# Patient Record
Sex: MALE | Race: WHITE | NOT HISPANIC OR LATINO
[De-identification: names, ages, dates, MRNs, and addresses within clinical notes are randomized per-mention and may not be internally consistent; named-entity substitution may affect disease eponyms.]

---

## 2017-04-10 ENCOUNTER — APPOINTMENT (OUTPATIENT)
Dept: COLORECTAL SURGERY | Facility: CLINIC | Age: 57
End: 2017-04-10

## 2017-04-10 DIAGNOSIS — Z87.891 PERSONAL HISTORY OF NICOTINE DEPENDENCE: ICD-10-CM

## 2017-04-10 RX ORDER — MELOXICAM 15 MG/1
15 TABLET ORAL
Qty: 30 | Refills: 0 | Status: ACTIVE | COMMUNITY
Start: 2016-12-01

## 2017-04-10 RX ORDER — LOSARTAN POTASSIUM 50 MG/1
50 TABLET, FILM COATED ORAL
Qty: 90 | Refills: 0 | Status: ACTIVE | COMMUNITY
Start: 2017-01-30

## 2017-04-10 RX ORDER — FINASTERIDE 5 MG/1
5 TABLET, FILM COATED ORAL
Qty: 90 | Refills: 0 | Status: ACTIVE | COMMUNITY
Start: 2017-01-30

## 2017-04-10 RX ORDER — HALOBETASOL PROPIONATE 0.5 MG/G
0.05 OINTMENT TOPICAL
Qty: 50 | Refills: 0 | Status: ACTIVE | COMMUNITY
Start: 2017-02-02

## 2017-04-10 RX ORDER — ZOLPIDEM TARTRATE 10 MG/1
10 TABLET ORAL
Qty: 30 | Refills: 0 | Status: ACTIVE | COMMUNITY
Start: 2016-12-12

## 2017-04-10 RX ORDER — ECONAZOLE NITRATE 10 MG/G
1 CREAM TOPICAL
Qty: 15 | Refills: 0 | Status: ACTIVE | COMMUNITY
Start: 2017-01-17

## 2017-04-10 RX ORDER — KETOCONAZOLE 20 MG/G
2 CREAM TOPICAL
Qty: 30 | Refills: 0 | Status: ACTIVE | COMMUNITY
Start: 2017-02-06

## 2017-04-10 RX ORDER — HALOBETASOL PROPIONATE 0.5 MG/G
0.05 CREAM TOPICAL
Qty: 50 | Refills: 0 | Status: ACTIVE | COMMUNITY
Start: 2016-12-22

## 2017-04-19 ENCOUNTER — OUTPATIENT (OUTPATIENT)
Dept: OUTPATIENT SERVICES | Facility: HOSPITAL | Age: 57
LOS: 1 days | End: 2017-04-19
Payer: MEDICARE

## 2017-04-19 VITALS
SYSTOLIC BLOOD PRESSURE: 147 MMHG | RESPIRATION RATE: 14 BRPM | HEIGHT: 68 IN | TEMPERATURE: 98 F | HEART RATE: 79 BPM | DIASTOLIC BLOOD PRESSURE: 85 MMHG | WEIGHT: 315 LBS | OXYGEN SATURATION: 96 %

## 2017-04-19 DIAGNOSIS — G47.33 OBSTRUCTIVE SLEEP APNEA (ADULT) (PEDIATRIC): ICD-10-CM

## 2017-04-19 DIAGNOSIS — K60.2 ANAL FISSURE, UNSPECIFIED: ICD-10-CM

## 2017-04-19 DIAGNOSIS — Z90.89 ACQUIRED ABSENCE OF OTHER ORGANS: Chronic | ICD-10-CM

## 2017-04-19 DIAGNOSIS — I10 ESSENTIAL (PRIMARY) HYPERTENSION: ICD-10-CM

## 2017-04-19 DIAGNOSIS — Z01.818 ENCOUNTER FOR OTHER PREPROCEDURAL EXAMINATION: ICD-10-CM

## 2017-04-19 LAB
ANION GAP SERPL CALC-SCNC: 18 MMOL/L — HIGH (ref 5–17)
BUN SERPL-MCNC: 21 MG/DL — SIGNIFICANT CHANGE UP (ref 7–23)
CALCIUM SERPL-MCNC: 10 MG/DL — SIGNIFICANT CHANGE UP (ref 8.4–10.5)
CHLORIDE SERPL-SCNC: 103 MMOL/L — SIGNIFICANT CHANGE UP (ref 96–108)
CO2 SERPL-SCNC: 20 MMOL/L — LOW (ref 22–31)
CREAT SERPL-MCNC: 1.32 MG/DL — HIGH (ref 0.5–1.3)
GLUCOSE SERPL-MCNC: 104 MG/DL — HIGH (ref 70–99)
HCT VFR BLD CALC: 44.6 % — SIGNIFICANT CHANGE UP (ref 39–50)
HGB BLD-MCNC: 14.6 G/DL — SIGNIFICANT CHANGE UP (ref 13–17)
MCHC RBC-ENTMCNC: 29.6 PG — SIGNIFICANT CHANGE UP (ref 27–34)
MCHC RBC-ENTMCNC: 32.7 GM/DL — SIGNIFICANT CHANGE UP (ref 32–36)
MCV RBC AUTO: 90.3 FL — SIGNIFICANT CHANGE UP (ref 80–100)
PLATELET # BLD AUTO: 287 K/UL — SIGNIFICANT CHANGE UP (ref 150–400)
POTASSIUM SERPL-MCNC: 4.8 MMOL/L — SIGNIFICANT CHANGE UP (ref 3.5–5.3)
POTASSIUM SERPL-SCNC: 4.8 MMOL/L — SIGNIFICANT CHANGE UP (ref 3.5–5.3)
RBC # BLD: 4.94 M/UL — SIGNIFICANT CHANGE UP (ref 4.2–5.8)
RBC # FLD: 13.2 % — SIGNIFICANT CHANGE UP (ref 10.3–14.5)
SODIUM SERPL-SCNC: 141 MMOL/L — SIGNIFICANT CHANGE UP (ref 135–145)
WBC # BLD: 9.64 K/UL — SIGNIFICANT CHANGE UP (ref 3.8–10.5)
WBC # FLD AUTO: 9.64 K/UL — SIGNIFICANT CHANGE UP (ref 3.8–10.5)

## 2017-04-19 PROCEDURE — 85027 COMPLETE CBC AUTOMATED: CPT

## 2017-04-19 PROCEDURE — 80048 BASIC METABOLIC PNL TOTAL CA: CPT

## 2017-04-19 RX ORDER — SODIUM CHLORIDE 9 MG/ML
3 INJECTION INTRAMUSCULAR; INTRAVENOUS; SUBCUTANEOUS EVERY 8 HOURS
Qty: 0 | Refills: 0 | Status: DISCONTINUED | OUTPATIENT
Start: 2017-04-21 | End: 2017-05-06

## 2017-04-19 RX ORDER — LIDOCAINE HCL 20 MG/ML
0.2 VIAL (ML) INJECTION ONCE
Qty: 0 | Refills: 0 | Status: DISCONTINUED | OUTPATIENT
Start: 2017-04-21 | End: 2017-05-06

## 2017-04-19 NOTE — H&P PST ADULT - HISTORY OF PRESENT ILLNESS
57 yo male.   presents to PST scheduled for rectal exam under anesthesia, I+D rectal abscess. 57 yo male. h/o BRITTNY with CPAP, obesity, HTN, BPH, known anal fistula.  c/o acute worsening of rectal pain and bright red blood per rectum (moderate amount) with bowel movements in the past 1-2 weeks. evaluated by GI, Dr. Beltran, presents to PST scheduled for rectal exam under anesthesia, I+D rectal abscess.

## 2017-04-19 NOTE — H&P PST ADULT - NEGATIVE GASTROINTESTINAL SYMPTOMS
no diarrhea/no abdominal pain/see HPI/no change in bowel habits/no nausea/no vomiting/no constipation

## 2017-04-19 NOTE — H&P PST ADULT - MUSCULOSKELETAL COMMENTS
s/p mechanical [id number], c/o chronic low back, hips, shoulders, knees and neck pain, takes tylenol, meloxicam prn for pain control gait steady with cane

## 2017-04-19 NOTE — H&P PST ADULT - PMH
Benign prostatic hypertrophy    Hypertension    Obesity    BRITTNY on CPAP Benign prostatic hypertrophy    Eczema    Hypertension    Obesity    BRITTNY on CPAP

## 2017-04-21 ENCOUNTER — OUTPATIENT (OUTPATIENT)
Dept: OUTPATIENT SERVICES | Facility: HOSPITAL | Age: 57
LOS: 1 days | End: 2017-04-21
Payer: MEDICARE

## 2017-04-21 ENCOUNTER — APPOINTMENT (OUTPATIENT)
Dept: COLORECTAL SURGERY | Facility: HOSPITAL | Age: 57
End: 2017-04-21

## 2017-04-21 ENCOUNTER — TRANSCRIPTION ENCOUNTER (OUTPATIENT)
Age: 57
End: 2017-04-21

## 2017-04-21 VITALS
TEMPERATURE: 98 F | SYSTOLIC BLOOD PRESSURE: 159 MMHG | DIASTOLIC BLOOD PRESSURE: 78 MMHG | RESPIRATION RATE: 16 BRPM | HEART RATE: 67 BPM | HEIGHT: 68 IN | OXYGEN SATURATION: 98 % | WEIGHT: 315 LBS

## 2017-04-21 VITALS
RESPIRATION RATE: 15 BRPM | OXYGEN SATURATION: 100 % | SYSTOLIC BLOOD PRESSURE: 147 MMHG | HEART RATE: 68 BPM | DIASTOLIC BLOOD PRESSURE: 68 MMHG

## 2017-04-21 DIAGNOSIS — K60.2 ANAL FISSURE, UNSPECIFIED: ICD-10-CM

## 2017-04-21 DIAGNOSIS — Z90.89 ACQUIRED ABSENCE OF OTHER ORGANS: Chronic | ICD-10-CM

## 2017-04-21 PROCEDURE — 46270 REMOVE ANAL FIST SUBQ: CPT

## 2017-04-21 PROCEDURE — C1889: CPT

## 2017-04-21 RX ORDER — APREPITANT 80 MG/1
40 CAPSULE ORAL ONCE
Qty: 0 | Refills: 0 | Status: COMPLETED | OUTPATIENT
Start: 2017-04-21 | End: 2017-04-21

## 2017-04-21 RX ORDER — ACETAMINOPHEN 500 MG
650 TABLET ORAL
Qty: 0 | Refills: 0 | COMMUNITY

## 2017-04-21 RX ORDER — OXYCODONE HYDROCHLORIDE 5 MG/1
1 TABLET ORAL
Qty: 28 | Refills: 0 | OUTPATIENT
Start: 2017-04-21 | End: 2017-04-28

## 2017-04-21 RX ORDER — ACETAMINOPHEN 500 MG
1000 TABLET ORAL ONCE
Qty: 0 | Refills: 0 | Status: COMPLETED | OUTPATIENT
Start: 2017-04-21 | End: 2017-04-21

## 2017-04-21 RX ORDER — SODIUM CHLORIDE 9 MG/ML
1000 INJECTION, SOLUTION INTRAVENOUS
Qty: 0 | Refills: 0 | Status: DISCONTINUED | OUTPATIENT
Start: 2017-04-21 | End: 2017-05-06

## 2017-04-21 RX ORDER — ONDANSETRON 8 MG/1
4 TABLET, FILM COATED ORAL ONCE
Qty: 0 | Refills: 0 | Status: DISCONTINUED | OUTPATIENT
Start: 2017-04-21 | End: 2017-05-06

## 2017-04-21 RX ADMIN — Medication 400 MILLIGRAM(S): at 09:33

## 2017-04-21 RX ADMIN — APREPITANT 40 MILLIGRAM(S): 80 CAPSULE ORAL at 08:13

## 2017-04-21 NOTE — ASU DISCHARGE PLAN (ADULT/PEDIATRIC). - MEDICATION SUMMARY - MEDICATIONS TO STOP TAKING
I will STOP taking the medications listed below when I get home from the hospital:    acetaminophen  -- 650 milligram(s) by mouth 2 times a day, As Needed

## 2017-04-21 NOTE — ASU DISCHARGE PLAN (ADULT/PEDIATRIC). - MEDICATION SUMMARY - MEDICATIONS TO TAKE
I will START or STAY ON the medications listed below when I get home from the hospital:    clonazePAM 0.5 mg oral tablet  -- 1.5 tab(s) by mouth 2 times a day, As Needed  -- Indication: For home med    probenecid 500 mg oral tablet  --   1-2 tabs a daily  -- Indication: For home med    halobetasol propionate cream ointment for eczema prn  --     -- Indication: For home med    supplements: fiber well gummies  --     -- Indication: For home med    Metamucil  --  by mouth daily  -- Indication: For home med    famotidine 20 mg oral tablet x2 preop  --     -- Indication: For home med    finasteride 5 mg oral tablet  -- 1 tab(s) by mouth once a day  -- Indication: For home med    acetaminophen-oxycodone 325 mg-5 mg oral tablet  -- 1 tab(s) by mouth every 6 hours, As Needed -for severe pain MDD:6  -- Caution federal law prohibits the transfer of this drug to any person other  than the person for whom it was prescribed.  May cause drowsiness.  Alcohol may intensify this effect.  Use care when operating dangerous machinery.  This prescription cannot be refilled.  This product contains acetaminophen.  Do not use  with any other product containing acetaminophen to prevent possible liver damage.  Using more of this medication than prescribed may cause serious breathing problems.    -- Indication: For post-operative pain    meloxicam 15 mg oral tablet  -- 1 tab(s) by mouth once a day, As Needed after dinner  -- Indication: For home med    losartan 50 mg oral tablet  -- 1 tab(s) by mouth once a day  -- Indication: For home med    zolpidem 10 mg oral tablet  -- 1 tab(s) by mouth once a day (at bedtime), As Needed  -- Indication: For home med    ketoconazole 2% topical cream  -- Apply on skin to affected area once a day feet prn  -- Indication: For home med

## 2017-04-21 NOTE — ASU DISCHARGE PLAN (ADULT/PEDIATRIC). - NOTIFY
Inability to Tolerate Liquids or Foods/Fever greater than 101/Pain not relieved by Medications/Bleeding that does not stop/Unable to Urinate/Persistent Nausea and Vomiting

## 2017-05-17 ENCOUNTER — APPOINTMENT (OUTPATIENT)
Dept: COLORECTAL SURGERY | Facility: CLINIC | Age: 57
End: 2017-05-17

## 2017-06-07 ENCOUNTER — APPOINTMENT (OUTPATIENT)
Dept: COLORECTAL SURGERY | Facility: CLINIC | Age: 57
End: 2017-06-07

## 2017-06-07 DIAGNOSIS — K64.9 UNSPECIFIED HEMORRHOIDS: ICD-10-CM

## 2017-06-07 DIAGNOSIS — K61.0 ANAL ABSCESS: ICD-10-CM

## 2017-07-10 ENCOUNTER — APPOINTMENT (OUTPATIENT)
Dept: COLORECTAL SURGERY | Facility: CLINIC | Age: 57
End: 2017-07-10

## 2017-07-10 DIAGNOSIS — K60.2 ANAL FISSURE, UNSPECIFIED: ICD-10-CM

## 2017-08-10 ENCOUNTER — APPOINTMENT (OUTPATIENT)
Dept: COLORECTAL SURGERY | Facility: CLINIC | Age: 57
End: 2017-08-10

## 2018-04-25 ENCOUNTER — APPOINTMENT (OUTPATIENT)
Dept: ORTHOPEDIC SURGERY | Facility: CLINIC | Age: 58
End: 2018-04-25
Payer: MEDICARE

## 2018-04-25 VITALS
HEART RATE: 79 BPM | WEIGHT: 310 LBS | DIASTOLIC BLOOD PRESSURE: 79 MMHG | BODY MASS INDEX: 46.98 KG/M2 | SYSTOLIC BLOOD PRESSURE: 175 MMHG | HEIGHT: 68 IN

## 2018-04-25 DIAGNOSIS — M25.551 PAIN IN RIGHT HIP: ICD-10-CM

## 2018-04-25 DIAGNOSIS — M25.552 PAIN IN RIGHT HIP: ICD-10-CM

## 2018-04-25 DIAGNOSIS — M25.561 PAIN IN RIGHT KNEE: ICD-10-CM

## 2018-04-25 DIAGNOSIS — M25.562 PAIN IN LEFT KNEE: ICD-10-CM

## 2018-04-25 PROCEDURE — 99205 OFFICE O/P NEW HI 60 MIN: CPT

## 2018-04-25 PROCEDURE — 73522 X-RAY EXAM HIPS BI 3-4 VIEWS: CPT

## 2018-04-25 PROCEDURE — 73562 X-RAY EXAM OF KNEE 3: CPT | Mod: 50

## 2018-04-25 PROCEDURE — 72100 X-RAY EXAM L-S SPINE 2/3 VWS: CPT

## 2018-07-17 PROBLEM — L30.9 DERMATITIS, UNSPECIFIED: Chronic | Status: ACTIVE | Noted: 2017-04-19

## 2018-07-17 PROBLEM — I10 ESSENTIAL (PRIMARY) HYPERTENSION: Chronic | Status: ACTIVE | Noted: 2017-04-19

## 2018-07-17 PROBLEM — N40.0 BENIGN PROSTATIC HYPERPLASIA WITHOUT LOWER URINARY TRACT SYMPTOMS: Chronic | Status: ACTIVE | Noted: 2017-04-19

## 2018-07-17 PROBLEM — E66.9 OBESITY, UNSPECIFIED: Chronic | Status: ACTIVE | Noted: 2017-04-19

## 2018-07-17 PROBLEM — G47.33 OBSTRUCTIVE SLEEP APNEA (ADULT) (PEDIATRIC): Chronic | Status: ACTIVE | Noted: 2017-04-19

## 2018-07-18 ENCOUNTER — APPOINTMENT (OUTPATIENT)
Dept: ORTHOPEDIC SURGERY | Facility: CLINIC | Age: 58
End: 2018-07-18
Payer: MEDICARE

## 2018-07-18 VITALS
HEART RATE: 76 BPM | BODY MASS INDEX: 45.92 KG/M2 | HEIGHT: 68 IN | DIASTOLIC BLOOD PRESSURE: 80 MMHG | WEIGHT: 303 LBS | SYSTOLIC BLOOD PRESSURE: 153 MMHG

## 2018-07-18 DIAGNOSIS — E66.01 MORBID (SEVERE) OBESITY DUE TO EXCESS CALORIES: ICD-10-CM

## 2018-07-18 DIAGNOSIS — M16.11 UNILATERAL PRIMARY OSTEOARTHRITIS, RIGHT HIP: ICD-10-CM

## 2018-07-18 PROCEDURE — 99213 OFFICE O/P EST LOW 20 MIN: CPT

## 2018-07-23 ENCOUNTER — OUTPATIENT (OUTPATIENT)
Dept: OUTPATIENT SERVICES | Facility: HOSPITAL | Age: 58
LOS: 1 days | End: 2018-07-23
Payer: MEDICARE

## 2018-07-23 VITALS
WEIGHT: 304.9 LBS | SYSTOLIC BLOOD PRESSURE: 164 MMHG | HEART RATE: 76 BPM | HEIGHT: 68 IN | RESPIRATION RATE: 20 BRPM | DIASTOLIC BLOOD PRESSURE: 100 MMHG | TEMPERATURE: 99 F | OXYGEN SATURATION: 97 %

## 2018-07-23 DIAGNOSIS — L98.8 OTHER SPECIFIED DISORDERS OF THE SKIN AND SUBCUTANEOUS TISSUE: Chronic | ICD-10-CM

## 2018-07-23 DIAGNOSIS — N40.0 BENIGN PROSTATIC HYPERPLASIA WITHOUT LOWER URINARY TRACT SYMPTOMS: ICD-10-CM

## 2018-07-23 DIAGNOSIS — Z90.89 ACQUIRED ABSENCE OF OTHER ORGANS: Chronic | ICD-10-CM

## 2018-07-23 DIAGNOSIS — M16.9 OSTEOARTHRITIS OF HIP, UNSPECIFIED: ICD-10-CM

## 2018-07-23 DIAGNOSIS — M16.11 UNILATERAL PRIMARY OSTEOARTHRITIS, RIGHT HIP: ICD-10-CM

## 2018-07-23 DIAGNOSIS — Z01.818 ENCOUNTER FOR OTHER PREPROCEDURAL EXAMINATION: ICD-10-CM

## 2018-07-23 DIAGNOSIS — Z29.9 ENCOUNTER FOR PROPHYLACTIC MEASURES, UNSPECIFIED: ICD-10-CM

## 2018-07-23 DIAGNOSIS — I10 ESSENTIAL (PRIMARY) HYPERTENSION: ICD-10-CM

## 2018-07-23 DIAGNOSIS — Z98.890 OTHER SPECIFIED POSTPROCEDURAL STATES: Chronic | ICD-10-CM

## 2018-07-23 DIAGNOSIS — G47.33 OBSTRUCTIVE SLEEP APNEA (ADULT) (PEDIATRIC): ICD-10-CM

## 2018-07-23 LAB
ANION GAP SERPL CALC-SCNC: 17 MMOL/L — SIGNIFICANT CHANGE UP (ref 5–17)
BLD GP AB SCN SERPL QL: NEGATIVE — SIGNIFICANT CHANGE UP
BUN SERPL-MCNC: 25 MG/DL — HIGH (ref 7–23)
CALCIUM SERPL-MCNC: 9.1 MG/DL — SIGNIFICANT CHANGE UP (ref 8.4–10.5)
CHLORIDE SERPL-SCNC: 103 MMOL/L — SIGNIFICANT CHANGE UP (ref 96–108)
CO2 SERPL-SCNC: 22 MMOL/L — SIGNIFICANT CHANGE UP (ref 22–31)
CREAT SERPL-MCNC: 1.29 MG/DL — SIGNIFICANT CHANGE UP (ref 0.5–1.3)
GLUCOSE SERPL-MCNC: 97 MG/DL — SIGNIFICANT CHANGE UP (ref 70–99)
HBA1C BLD-MCNC: 5.4 % — SIGNIFICANT CHANGE UP (ref 4–5.6)
HCT VFR BLD CALC: 42.4 % — SIGNIFICANT CHANGE UP (ref 39–50)
HGB BLD-MCNC: 14.2 G/DL — SIGNIFICANT CHANGE UP (ref 13–17)
MCHC RBC-ENTMCNC: 29.3 PG — SIGNIFICANT CHANGE UP (ref 27–34)
MCHC RBC-ENTMCNC: 33.5 GM/DL — SIGNIFICANT CHANGE UP (ref 32–36)
MCV RBC AUTO: 87.6 FL — SIGNIFICANT CHANGE UP (ref 80–100)
MRSA PCR RESULT.: SIGNIFICANT CHANGE UP
PLATELET # BLD AUTO: 272 K/UL — SIGNIFICANT CHANGE UP (ref 150–400)
POTASSIUM SERPL-MCNC: 4.2 MMOL/L — SIGNIFICANT CHANGE UP (ref 3.5–5.3)
POTASSIUM SERPL-SCNC: 4.2 MMOL/L — SIGNIFICANT CHANGE UP (ref 3.5–5.3)
RBC # BLD: 4.84 M/UL — SIGNIFICANT CHANGE UP (ref 4.2–5.8)
RBC # FLD: 13.5 % — SIGNIFICANT CHANGE UP (ref 10.3–14.5)
RH IG SCN BLD-IMP: POSITIVE — SIGNIFICANT CHANGE UP
S AUREUS DNA NOSE QL NAA+PROBE: SIGNIFICANT CHANGE UP
SODIUM SERPL-SCNC: 142 MMOL/L — SIGNIFICANT CHANGE UP (ref 135–145)
WBC # BLD: 8.03 K/UL — SIGNIFICANT CHANGE UP (ref 3.8–10.5)
WBC # FLD AUTO: 8.03 K/UL — SIGNIFICANT CHANGE UP (ref 3.8–10.5)

## 2018-07-23 PROCEDURE — 93005 ELECTROCARDIOGRAM TRACING: CPT

## 2018-07-23 PROCEDURE — 86850 RBC ANTIBODY SCREEN: CPT

## 2018-07-23 PROCEDURE — 87641 MR-STAPH DNA AMP PROBE: CPT

## 2018-07-23 PROCEDURE — G0463: CPT

## 2018-07-23 PROCEDURE — 85027 COMPLETE CBC AUTOMATED: CPT

## 2018-07-23 PROCEDURE — 93010 ELECTROCARDIOGRAM REPORT: CPT

## 2018-07-23 PROCEDURE — 86901 BLOOD TYPING SEROLOGIC RH(D): CPT

## 2018-07-23 PROCEDURE — 87640 STAPH A DNA AMP PROBE: CPT

## 2018-07-23 PROCEDURE — 86900 BLOOD TYPING SEROLOGIC ABO: CPT

## 2018-07-23 PROCEDURE — 80048 BASIC METABOLIC PNL TOTAL CA: CPT

## 2018-07-23 PROCEDURE — 83036 HEMOGLOBIN GLYCOSYLATED A1C: CPT

## 2018-07-23 RX ORDER — MELOXICAM 15 MG/1
1 TABLET ORAL
Qty: 0 | Refills: 0 | COMMUNITY

## 2018-07-23 RX ORDER — CLONAZEPAM 1 MG
1.5 TABLET ORAL
Qty: 0 | Refills: 0 | COMMUNITY

## 2018-07-23 RX ORDER — FINASTERIDE 5 MG/1
1 TABLET, FILM COATED ORAL
Qty: 0 | Refills: 0 | COMMUNITY

## 2018-07-23 RX ORDER — LOSARTAN POTASSIUM 100 MG/1
1 TABLET, FILM COATED ORAL
Qty: 0 | Refills: 0 | COMMUNITY

## 2018-07-23 RX ORDER — CEFAZOLIN SODIUM 1 G
3000 VIAL (EA) INJECTION ONCE
Qty: 0 | Refills: 0 | Status: DISCONTINUED | OUTPATIENT
Start: 2018-07-30 | End: 2018-08-02

## 2018-07-23 RX ORDER — ZOLPIDEM TARTRATE 10 MG/1
1 TABLET ORAL
Qty: 0 | Refills: 0 | COMMUNITY

## 2018-07-23 RX ORDER — PSYLLIUM SEED (WITH DEXTROSE)
0 POWDER (GRAM) ORAL
Qty: 0 | Refills: 0 | COMMUNITY

## 2018-07-23 RX ORDER — KETOCONAZOLE 20 MG/G
1 AEROSOL, FOAM TOPICAL
Qty: 0 | Refills: 0 | COMMUNITY

## 2018-07-23 NOTE — H&P PST ADULT - ASSESSMENT
CAPRINI SCORE [CLOT]    AGE RELATED RISK FACTORS                                                       MOBILITY RELATED FACTORS  [1 ] Age 41-60 years                                            (1 Point)                  [ ] Bed rest                                                        (1 Point)  [ ] Age: 61-74 years                                           (2 Points)                 [ ] Plaster cast                                                   (2 Points)  [ ] Age= 75 years                                              (3 Points)                 [ ] Bed bound for more than 72 hours                 (2 Points)    DISEASE RELATED RISK FACTORS                                               GENDER SPECIFIC FACTORS  [ ] Edema in the lower extremities                       (1 Point)                  [ ] Pregnancy                                                     (1 Point)  [ ] Varicose veins                                               (1 Point)                  [ ] Post-partum < 6 weeks                                   (1 Point)             [1 ] BMI > 25 Kg/m2                                            (1 Point)                  [ ] Hormonal therapy  or oral contraception          (1 Point)                 [ ] Sepsis (in the previous month)                        (1 Point)                  [ ] History of pregnancy complications                 (1 point)  [ ] Pneumonia or serious lung disease                                               [ ] Unexplained or recurrent                     (1 Point)           (in the previous month)                               (1 Point)  [ ] Abnormal pulmonary function test                     (1 Point)                 SURGERY RELATED RISK FACTORS  [ ] Acute myocardial infarction                              (1 Point)                 [ ]  Section                                             (1 Point)  [ ] Congestive heart failure (in the previous month)  (1 Point)               [ ] Minor surgery                                                  (1 Point)   [ ] Inflammatory bowel disease                             (1 Point)                 [ ] Arthroscopic surgery                                        (2 Points)  [ ] Central venous access                                      (2 Points)                [ ] General surgery lasting more than 45 minutes   (2 Points)       [ ] Stroke (in the previous month)                          (5 Points)               [5 ] Elective arthroplasty                                         (5 Points)                                                                                                                                               HEMATOLOGY RELATED FACTORS                                                 TRAUMA RELATED RISK FACTORS  [ ] Prior episodes of VTE                                     (3 Points)                [ ] Fracture of the hip, pelvis, or leg                       (5 Points)  [ ] Positive family history for VTE                         (3 Points)                 [ ] Acute spinal cord injury (in the previous month)  (5 Points)  [ ] Prothrombin 34074 A                                     (3 Points)                 [ ] Paralysis  (less than 1 month)                             (5 Points)  [ ] Factor V Leiden                                             (3 Points)                  [ ] Multiple Trauma within 1 month                        (5 Points)  [ ] Lupus anticoagulants                                     (3 Points)                                                           [ ] Anticardiolipin antibodies                               (3 Points)                                                       [ ] High homocysteine in the blood                      (3 Points)                                             [ ] Other congenital or acquired thrombophilia      (3 Points)                                                [ ] Heparin induced thrombocytopenia                  (3 Points)                                          Total Score [       7   ]

## 2018-07-23 NOTE — H&P PST ADULT - HISTORY OF PRESENT ILLNESS
57 year old male with h/o HTN, BRITTNY, obesity, BPH, anxiety disorders, gout, is being seen in preadmission testing in preparation for right total hip replacement on July 30, 2018.

## 2018-07-23 NOTE — H&P PST ADULT - PROBLEM SELECTOR PLAN 2
will continue current regiment pooja-op will continue current regiment pooja-op  patient to be evaluated by his pcp prior to surgery, apt on 7/27/2018

## 2018-07-23 NOTE — H&P PST ADULT - PMH
Benign prostatic hypertrophy    Eczema    Hypertension    Obesity    BRITTNY on CPAP Back pain  lumbar  Benign prostatic hypertrophy    Eczema    Gout  last attack more than 5 years ago  Hip osteoarthritis    Hypertension    Neuropathy of hand    Obesity    BRITTNY on CPAP    Shoulder pain, bilateral

## 2018-07-26 PROBLEM — M25.562 KNEE PAIN, LEFT: Status: ACTIVE | Noted: 2018-04-25

## 2018-07-26 PROBLEM — M25.561 KNEE PAIN, RIGHT: Status: ACTIVE | Noted: 2018-04-25

## 2018-07-29 ENCOUNTER — TRANSCRIPTION ENCOUNTER (OUTPATIENT)
Age: 58
End: 2018-07-29

## 2018-07-30 ENCOUNTER — INPATIENT (INPATIENT)
Facility: HOSPITAL | Age: 58
LOS: 2 days | Discharge: HOME | DRG: 470 | End: 2018-08-02
Attending: ORTHOPAEDIC SURGERY | Admitting: ORTHOPAEDIC SURGERY
Payer: MEDICARE

## 2018-07-30 ENCOUNTER — RESULT REVIEW (OUTPATIENT)
Age: 58
End: 2018-07-30

## 2018-07-30 ENCOUNTER — APPOINTMENT (OUTPATIENT)
Dept: ORTHOPEDIC SURGERY | Facility: HOSPITAL | Age: 58
End: 2018-07-30
Payer: MEDICARE

## 2018-07-30 VITALS
RESPIRATION RATE: 20 BRPM | TEMPERATURE: 98 F | SYSTOLIC BLOOD PRESSURE: 176 MMHG | OXYGEN SATURATION: 97 % | HEART RATE: 73 BPM | DIASTOLIC BLOOD PRESSURE: 108 MMHG

## 2018-07-30 DIAGNOSIS — Z78.9 OTHER SPECIFIED HEALTH STATUS: ICD-10-CM

## 2018-07-30 DIAGNOSIS — Z98.890 OTHER SPECIFIED POSTPROCEDURAL STATES: Chronic | ICD-10-CM

## 2018-07-30 DIAGNOSIS — L98.8 OTHER SPECIFIED DISORDERS OF THE SKIN AND SUBCUTANEOUS TISSUE: Chronic | ICD-10-CM

## 2018-07-30 DIAGNOSIS — M16.11 UNILATERAL PRIMARY OSTEOARTHRITIS, RIGHT HIP: ICD-10-CM

## 2018-07-30 DIAGNOSIS — Z82.62 FAMILY HISTORY OF OSTEOPOROSIS: ICD-10-CM

## 2018-07-30 DIAGNOSIS — Z90.89 ACQUIRED ABSENCE OF OTHER ORGANS: Chronic | ICD-10-CM

## 2018-07-30 DIAGNOSIS — Z82.61 FAMILY HISTORY OF ARTHRITIS: ICD-10-CM

## 2018-07-30 DIAGNOSIS — Z80.3 FAMILY HISTORY OF MALIGNANT NEOPLASM OF BREAST: ICD-10-CM

## 2018-07-30 DIAGNOSIS — Z86.79 PERSONAL HISTORY OF OTHER DISEASES OF THE CIRCULATORY SYSTEM: ICD-10-CM

## 2018-07-30 LAB
GLUCOSE BLDC GLUCOMTR-MCNC: 105 MG/DL — HIGH (ref 70–99)
INR BLD: 1.05 RATIO — SIGNIFICANT CHANGE UP (ref 0.88–1.16)
PROTHROM AB SERPL-ACNC: 11.5 SEC — SIGNIFICANT CHANGE UP (ref 9.8–12.7)
RH IG SCN BLD-IMP: POSITIVE — SIGNIFICANT CHANGE UP

## 2018-07-30 PROCEDURE — 73501 X-RAY EXAM HIP UNI 1 VIEW: CPT | Mod: 26,RT

## 2018-07-30 PROCEDURE — 72170 X-RAY EXAM OF PELVIS: CPT | Mod: 26,59

## 2018-07-30 PROCEDURE — 88311 DECALCIFY TISSUE: CPT | Mod: 26

## 2018-07-30 PROCEDURE — 88305 TISSUE EXAM BY PATHOLOGIST: CPT | Mod: 26

## 2018-07-30 PROCEDURE — 27130 TOTAL HIP ARTHROPLASTY: CPT | Mod: 22,RT

## 2018-07-30 PROCEDURE — 27130 TOTAL HIP ARTHROPLASTY: CPT | Mod: 82,RT

## 2018-07-30 RX ORDER — ACETAMINOPHEN 500 MG
1000 TABLET ORAL ONCE
Qty: 0 | Refills: 0 | Status: COMPLETED | OUTPATIENT
Start: 2018-07-31 | End: 2018-07-31

## 2018-07-30 RX ORDER — ZOLPIDEM TARTRATE 10 MG/1
5 TABLET ORAL AT BEDTIME
Qty: 0 | Refills: 0 | Status: DISCONTINUED | OUTPATIENT
Start: 2018-07-30 | End: 2018-08-02

## 2018-07-30 RX ORDER — ACETAMINOPHEN 500 MG
975 TABLET ORAL ONCE
Qty: 0 | Refills: 0 | Status: DISCONTINUED | OUTPATIENT
Start: 2018-07-30 | End: 2018-07-30

## 2018-07-30 RX ORDER — DEXAMETHASONE 0.5 MG/5ML
8 ELIXIR ORAL ONCE
Qty: 0 | Refills: 0 | Status: COMPLETED | OUTPATIENT
Start: 2018-07-31 | End: 2018-07-31

## 2018-07-30 RX ORDER — SODIUM CHLORIDE 9 MG/ML
500 INJECTION INTRAMUSCULAR; INTRAVENOUS; SUBCUTANEOUS ONCE
Qty: 0 | Refills: 0 | Status: COMPLETED | OUTPATIENT
Start: 2018-07-30

## 2018-07-30 RX ORDER — GABAPENTIN 400 MG/1
300 CAPSULE ORAL ONCE
Qty: 0 | Refills: 0 | Status: COMPLETED | OUTPATIENT
Start: 2018-07-30 | End: 2018-07-30

## 2018-07-30 RX ORDER — TRAMADOL HYDROCHLORIDE 50 MG/1
50 TABLET ORAL EVERY 6 HOURS
Qty: 0 | Refills: 0 | Status: DISCONTINUED | OUTPATIENT
Start: 2018-07-30 | End: 2018-08-02

## 2018-07-30 RX ORDER — TRAMADOL HYDROCHLORIDE 50 MG/1
50 TABLET ORAL ONCE
Qty: 0 | Refills: 0 | Status: DISCONTINUED | OUTPATIENT
Start: 2018-07-30 | End: 2018-07-30

## 2018-07-30 RX ORDER — POLYETHYLENE GLYCOL 3350 17 G/17G
17 POWDER, FOR SOLUTION ORAL DAILY
Qty: 0 | Refills: 0 | Status: DISCONTINUED | OUTPATIENT
Start: 2018-07-30 | End: 2018-08-02

## 2018-07-30 RX ORDER — CELECOXIB 200 MG/1
200 CAPSULE ORAL
Qty: 0 | Refills: 0 | Status: DISCONTINUED | OUTPATIENT
Start: 2018-08-01 | End: 2018-08-02

## 2018-07-30 RX ORDER — CLONAZEPAM 1 MG
0.5 TABLET ORAL
Qty: 0 | Refills: 0 | Status: DISCONTINUED | OUTPATIENT
Start: 2018-07-30 | End: 2018-07-31

## 2018-07-30 RX ORDER — SODIUM CHLORIDE 9 MG/ML
3 INJECTION INTRAMUSCULAR; INTRAVENOUS; SUBCUTANEOUS EVERY 8 HOURS
Qty: 0 | Refills: 0 | Status: DISCONTINUED | OUTPATIENT
Start: 2018-07-30 | End: 2018-07-30

## 2018-07-30 RX ORDER — DOCUSATE SODIUM 100 MG
100 CAPSULE ORAL THREE TIMES A DAY
Qty: 0 | Refills: 0 | Status: DISCONTINUED | OUTPATIENT
Start: 2018-07-30 | End: 2018-08-02

## 2018-07-30 RX ORDER — PANTOPRAZOLE SODIUM 20 MG/1
40 TABLET, DELAYED RELEASE ORAL ONCE
Qty: 0 | Refills: 0 | Status: COMPLETED | OUTPATIENT
Start: 2018-07-30 | End: 2018-07-30

## 2018-07-30 RX ORDER — SODIUM CHLORIDE 9 MG/ML
500 INJECTION INTRAMUSCULAR; INTRAVENOUS; SUBCUTANEOUS ONCE
Qty: 0 | Refills: 0 | Status: COMPLETED | OUTPATIENT
Start: 2018-07-30 | End: 2018-07-30

## 2018-07-30 RX ORDER — OXYCODONE HYDROCHLORIDE 5 MG/1
5 TABLET ORAL EVERY 4 HOURS
Qty: 0 | Refills: 0 | Status: DISCONTINUED | OUTPATIENT
Start: 2018-07-30 | End: 2018-08-02

## 2018-07-30 RX ORDER — SODIUM CHLORIDE 9 MG/ML
500 INJECTION INTRAMUSCULAR; INTRAVENOUS; SUBCUTANEOUS ONCE
Qty: 0 | Refills: 0 | Status: DISCONTINUED | OUTPATIENT
Start: 2018-07-30 | End: 2018-08-02

## 2018-07-30 RX ORDER — OXYCODONE HYDROCHLORIDE 5 MG/1
10 TABLET ORAL EVERY 4 HOURS
Qty: 0 | Refills: 0 | Status: DISCONTINUED | OUTPATIENT
Start: 2018-07-30 | End: 2018-08-02

## 2018-07-30 RX ORDER — ACETAMINOPHEN 500 MG
1000 TABLET ORAL ONCE
Qty: 0 | Refills: 0 | Status: COMPLETED | OUTPATIENT
Start: 2018-07-30 | End: 2018-07-30

## 2018-07-30 RX ORDER — WARFARIN SODIUM 2.5 MG/1
2.5 TABLET ORAL ONCE
Qty: 0 | Refills: 0 | Status: COMPLETED | OUTPATIENT
Start: 2018-07-30 | End: 2018-07-30

## 2018-07-30 RX ORDER — SENNA PLUS 8.6 MG/1
2 TABLET ORAL AT BEDTIME
Qty: 0 | Refills: 0 | Status: DISCONTINUED | OUTPATIENT
Start: 2018-07-30 | End: 2018-08-02

## 2018-07-30 RX ORDER — KETOROLAC TROMETHAMINE 30 MG/ML
30 SYRINGE (ML) INJECTION EVERY 8 HOURS
Qty: 0 | Refills: 0 | Status: DISCONTINUED | OUTPATIENT
Start: 2018-07-30 | End: 2018-07-31

## 2018-07-30 RX ORDER — DIPHENHYDRAMINE HCL 50 MG
25 CAPSULE ORAL EVERY 4 HOURS
Qty: 0 | Refills: 0 | Status: DISCONTINUED | OUTPATIENT
Start: 2018-07-30 | End: 2018-08-02

## 2018-07-30 RX ORDER — MAGNESIUM HYDROXIDE 400 MG/1
30 TABLET, CHEWABLE ORAL DAILY
Qty: 0 | Refills: 0 | Status: DISCONTINUED | OUTPATIENT
Start: 2018-07-30 | End: 2018-08-02

## 2018-07-30 RX ORDER — ACETAMINOPHEN 500 MG
975 TABLET ORAL EVERY 8 HOURS
Qty: 0 | Refills: 0 | Status: DISCONTINUED | OUTPATIENT
Start: 2018-07-31 | End: 2018-08-02

## 2018-07-30 RX ORDER — SODIUM CHLORIDE 9 MG/ML
500 INJECTION INTRAMUSCULAR; INTRAVENOUS; SUBCUTANEOUS ONCE
Qty: 0 | Refills: 0 | Status: COMPLETED | OUTPATIENT
Start: 2018-07-31 | End: 2018-07-31

## 2018-07-30 RX ORDER — BENZOCAINE AND MENTHOL 5; 1 G/100ML; G/100ML
1 LIQUID ORAL
Qty: 0 | Refills: 0 | Status: DISCONTINUED | OUTPATIENT
Start: 2018-07-30 | End: 2018-08-02

## 2018-07-30 RX ORDER — LOSARTAN POTASSIUM 100 MG/1
100 TABLET, FILM COATED ORAL
Qty: 0 | Refills: 0 | Status: DISCONTINUED | OUTPATIENT
Start: 2018-07-30 | End: 2018-08-01

## 2018-07-30 RX ORDER — LIDOCAINE HCL 20 MG/ML
0.2 VIAL (ML) INJECTION ONCE
Qty: 0 | Refills: 0 | Status: DISCONTINUED | OUTPATIENT
Start: 2018-07-30 | End: 2018-07-30

## 2018-07-30 RX ORDER — FINASTERIDE 5 MG/1
5 TABLET, FILM COATED ORAL DAILY
Qty: 0 | Refills: 0 | Status: DISCONTINUED | OUTPATIENT
Start: 2018-07-30 | End: 2018-08-02

## 2018-07-30 RX ORDER — CEFAZOLIN SODIUM 1 G
2000 VIAL (EA) INJECTION EVERY 8 HOURS
Qty: 0 | Refills: 0 | Status: COMPLETED | OUTPATIENT
Start: 2018-07-30 | End: 2018-07-31

## 2018-07-30 RX ORDER — ONDANSETRON 8 MG/1
4 TABLET, FILM COATED ORAL EVERY 6 HOURS
Qty: 0 | Refills: 0 | Status: DISCONTINUED | OUTPATIENT
Start: 2018-07-30 | End: 2018-08-02

## 2018-07-30 RX ORDER — SODIUM CHLORIDE 9 MG/ML
1000 INJECTION INTRAMUSCULAR; INTRAVENOUS; SUBCUTANEOUS
Qty: 0 | Refills: 0 | Status: DISCONTINUED | OUTPATIENT
Start: 2018-07-30 | End: 2018-08-02

## 2018-07-30 RX ORDER — PANTOPRAZOLE SODIUM 20 MG/1
40 TABLET, DELAYED RELEASE ORAL
Qty: 0 | Refills: 0 | Status: DISCONTINUED | OUTPATIENT
Start: 2018-07-30 | End: 2018-08-02

## 2018-07-30 RX ORDER — HYDROMORPHONE HYDROCHLORIDE 2 MG/ML
0.5 INJECTION INTRAMUSCULAR; INTRAVENOUS; SUBCUTANEOUS
Qty: 0 | Refills: 0 | Status: DISCONTINUED | OUTPATIENT
Start: 2018-07-30 | End: 2018-07-30

## 2018-07-30 RX ORDER — ONDANSETRON 8 MG/1
4 TABLET, FILM COATED ORAL ONCE
Qty: 0 | Refills: 0 | Status: COMPLETED | OUTPATIENT
Start: 2018-07-30 | End: 2018-07-30

## 2018-07-30 RX ORDER — FLUOCINONIDE/EMOLLIENT BASE 0.05 %
1 CREAM (GRAM) TOPICAL EVERY 12 HOURS
Qty: 0 | Refills: 0 | Status: DISCONTINUED | OUTPATIENT
Start: 2018-07-30 | End: 2018-08-02

## 2018-07-30 RX ORDER — ASPIRIN/CALCIUM CARB/MAGNESIUM 324 MG
325 TABLET ORAL
Qty: 0 | Refills: 0 | Status: DISCONTINUED | OUTPATIENT
Start: 2018-07-31 | End: 2018-08-02

## 2018-07-30 RX ORDER — LOSARTAN POTASSIUM 100 MG/1
1 TABLET, FILM COATED ORAL
Qty: 0 | Refills: 0 | COMMUNITY

## 2018-07-30 RX ADMIN — Medication 30 MILLIGRAM(S): at 18:17

## 2018-07-30 RX ADMIN — Medication 1000 MILLIGRAM(S): at 20:56

## 2018-07-30 RX ADMIN — GABAPENTIN 300 MILLIGRAM(S): 400 CAPSULE ORAL at 12:06

## 2018-07-30 RX ADMIN — Medication 400 MILLIGRAM(S): at 20:41

## 2018-07-30 RX ADMIN — Medication 0.5 MILLIGRAM(S): at 22:04

## 2018-07-30 RX ADMIN — TRAMADOL HYDROCHLORIDE 50 MILLIGRAM(S): 50 TABLET ORAL at 12:06

## 2018-07-30 RX ADMIN — SODIUM CHLORIDE 500 MILLILITER(S): 9 INJECTION INTRAMUSCULAR; INTRAVENOUS; SUBCUTANEOUS at 18:06

## 2018-07-30 RX ADMIN — SODIUM CHLORIDE 3 MILLILITER(S): 9 INJECTION INTRAMUSCULAR; INTRAVENOUS; SUBCUTANEOUS at 11:41

## 2018-07-30 RX ADMIN — SODIUM CHLORIDE 80 MILLILITER(S): 9 INJECTION INTRAMUSCULAR; INTRAVENOUS; SUBCUTANEOUS at 18:06

## 2018-07-30 RX ADMIN — LOSARTAN POTASSIUM 100 MILLIGRAM(S): 100 TABLET, FILM COATED ORAL at 22:12

## 2018-07-30 RX ADMIN — HYDROMORPHONE HYDROCHLORIDE 0.5 MILLIGRAM(S): 2 INJECTION INTRAMUSCULAR; INTRAVENOUS; SUBCUTANEOUS at 18:55

## 2018-07-30 RX ADMIN — HYDROMORPHONE HYDROCHLORIDE 0.5 MILLIGRAM(S): 2 INJECTION INTRAMUSCULAR; INTRAVENOUS; SUBCUTANEOUS at 18:40

## 2018-07-30 RX ADMIN — WARFARIN SODIUM 2.5 MILLIGRAM(S): 2.5 TABLET ORAL at 22:04

## 2018-07-30 RX ADMIN — PANTOPRAZOLE SODIUM 40 MILLIGRAM(S): 20 TABLET, DELAYED RELEASE ORAL at 12:06

## 2018-07-30 RX ADMIN — Medication 30 MILLIGRAM(S): at 18:03

## 2018-07-30 RX ADMIN — HYDROMORPHONE HYDROCHLORIDE 0.5 MILLIGRAM(S): 2 INJECTION INTRAMUSCULAR; INTRAVENOUS; SUBCUTANEOUS at 18:03

## 2018-07-30 RX ADMIN — HYDROMORPHONE HYDROCHLORIDE 0.5 MILLIGRAM(S): 2 INJECTION INTRAMUSCULAR; INTRAVENOUS; SUBCUTANEOUS at 18:17

## 2018-07-30 RX ADMIN — Medication 30 MILLIGRAM(S): at 22:04

## 2018-07-30 RX ADMIN — Medication 500 MILLIGRAM(S): at 22:44

## 2018-07-30 RX ADMIN — Medication 30 MILLIGRAM(S): at 22:34

## 2018-07-30 RX ADMIN — Medication 100 MILLIGRAM(S): at 22:04

## 2018-07-30 RX ADMIN — ONDANSETRON 4 MILLIGRAM(S): 8 TABLET, FILM COATED ORAL at 20:20

## 2018-07-30 NOTE — CHART NOTE - NSCHARTNOTEFT_GEN_A_CORE
Patient resting without complaints.  Denies chest pain, SOB, N/V.    T(C): 36.4 (07-30-18 @ 18:00)  T(F): 97.5 (07-30-18 @ 18:00)  HR: 72 (07-30-18 @ 18:15)  BP: 164/75 (07-30-18 @ 18:15)  RR: 16 (07-30-18 @ 18:15)  SpO2: 99% (07-30-18 @ 18:15)      Exam:  Alert and Oriented, No Acute Distress    R Lower Extremity:  Hip Dsg CDI  Calf Soft, Non-tender  +PF/DF  Sensation grossly intact       Post-op Pelvis XR done.    A/P: 57y Male s/p R PAYAL; Stable  -Pain Control  -DVT PPx; IS  -Am labs  -PT/OT Eval-WBAT RLE  -Continue Current Tx.    Omayra Eldridge PA-C  Orthopedic Surgery  Pagers [phone]

## 2018-07-30 NOTE — BRIEF OPERATIVE NOTE - PROCEDURE
<<-----Click on this checkbox to enter Procedure Arthroplasty, hip, total  07/30/2018  right  Active  CHEYTRBLAINE

## 2018-07-31 ENCOUNTER — TRANSCRIPTION ENCOUNTER (OUTPATIENT)
Age: 58
End: 2018-07-31

## 2018-07-31 LAB
ANION GAP SERPL CALC-SCNC: 13 MMOL/L — SIGNIFICANT CHANGE UP (ref 5–17)
BUN SERPL-MCNC: 19 MG/DL — SIGNIFICANT CHANGE UP (ref 7–23)
CALCIUM SERPL-MCNC: 8.1 MG/DL — LOW (ref 8.4–10.5)
CHLORIDE SERPL-SCNC: 102 MMOL/L — SIGNIFICANT CHANGE UP (ref 96–108)
CO2 SERPL-SCNC: 22 MMOL/L — SIGNIFICANT CHANGE UP (ref 22–31)
CREAT SERPL-MCNC: 1.3 MG/DL — SIGNIFICANT CHANGE UP (ref 0.5–1.3)
GLUCOSE SERPL-MCNC: 134 MG/DL — HIGH (ref 70–99)
HCT VFR BLD CALC: 35.3 % — LOW (ref 39–50)
HGB BLD-MCNC: 11.7 G/DL — LOW (ref 13–17)
INR BLD: 1.03 RATIO — SIGNIFICANT CHANGE UP (ref 0.88–1.16)
MCHC RBC-ENTMCNC: 29.5 PG — SIGNIFICANT CHANGE UP (ref 27–34)
MCHC RBC-ENTMCNC: 33.1 GM/DL — SIGNIFICANT CHANGE UP (ref 32–36)
MCV RBC AUTO: 88.9 FL — SIGNIFICANT CHANGE UP (ref 80–100)
PLATELET # BLD AUTO: 264 K/UL — SIGNIFICANT CHANGE UP (ref 150–400)
POTASSIUM SERPL-MCNC: 4.6 MMOL/L — SIGNIFICANT CHANGE UP (ref 3.5–5.3)
POTASSIUM SERPL-SCNC: 4.6 MMOL/L — SIGNIFICANT CHANGE UP (ref 3.5–5.3)
PROTHROM AB SERPL-ACNC: 11.6 SEC — SIGNIFICANT CHANGE UP (ref 10–13.1)
RBC # BLD: 3.97 M/UL — LOW (ref 4.2–5.8)
RBC # FLD: 13.4 % — SIGNIFICANT CHANGE UP (ref 10.3–14.5)
SODIUM SERPL-SCNC: 137 MMOL/L — SIGNIFICANT CHANGE UP (ref 135–145)
WBC # BLD: 13.21 K/UL — HIGH (ref 3.8–10.5)
WBC # FLD AUTO: 13.21 K/UL — HIGH (ref 3.8–10.5)

## 2018-07-31 RX ORDER — WARFARIN SODIUM 2.5 MG/1
5 TABLET ORAL ONCE
Qty: 0 | Refills: 0 | Status: COMPLETED | OUTPATIENT
Start: 2018-07-31 | End: 2018-07-31

## 2018-07-31 RX ORDER — CLONAZEPAM 1 MG
0.75 TABLET ORAL
Qty: 0 | Refills: 0 | Status: DISCONTINUED | OUTPATIENT
Start: 2018-07-31 | End: 2018-08-02

## 2018-07-31 RX ORDER — CLONAZEPAM 1 MG
0.5 TABLET ORAL THREE TIMES A DAY
Qty: 0 | Refills: 0 | Status: DISCONTINUED | OUTPATIENT
Start: 2018-07-31 | End: 2018-07-31

## 2018-07-31 RX ADMIN — Medication 1 TABLET(S): at 12:10

## 2018-07-31 RX ADMIN — Medication 1000 MILLIGRAM(S): at 06:43

## 2018-07-31 RX ADMIN — Medication 100 MILLIGRAM(S): at 21:30

## 2018-07-31 RX ADMIN — Medication 101.6 MILLIGRAM(S): at 06:45

## 2018-07-31 RX ADMIN — Medication 400 MILLIGRAM(S): at 13:23

## 2018-07-31 RX ADMIN — Medication 30 MILLIGRAM(S): at 06:43

## 2018-07-31 RX ADMIN — Medication 100 MILLIGRAM(S): at 06:17

## 2018-07-31 RX ADMIN — TRAMADOL HYDROCHLORIDE 50 MILLIGRAM(S): 50 TABLET ORAL at 02:36

## 2018-07-31 RX ADMIN — TRAMADOL HYDROCHLORIDE 50 MILLIGRAM(S): 50 TABLET ORAL at 21:29

## 2018-07-31 RX ADMIN — PANTOPRAZOLE SODIUM 40 MILLIGRAM(S): 20 TABLET, DELAYED RELEASE ORAL at 06:17

## 2018-07-31 RX ADMIN — Medication 0.75 MILLIGRAM(S): at 18:03

## 2018-07-31 RX ADMIN — FINASTERIDE 5 MILLIGRAM(S): 5 TABLET, FILM COATED ORAL at 12:10

## 2018-07-31 RX ADMIN — Medication 325 MILLIGRAM(S): at 06:17

## 2018-07-31 RX ADMIN — SODIUM CHLORIDE 500 MILLILITER(S): 9 INJECTION INTRAMUSCULAR; INTRAVENOUS; SUBCUTANEOUS at 06:15

## 2018-07-31 RX ADMIN — Medication 100 MILLIGRAM(S): at 12:10

## 2018-07-31 RX ADMIN — Medication 325 MILLIGRAM(S): at 18:04

## 2018-07-31 RX ADMIN — Medication 1 TABLET(S): at 06:17

## 2018-07-31 RX ADMIN — Medication 500 MILLIGRAM(S): at 12:11

## 2018-07-31 RX ADMIN — TRAMADOL HYDROCHLORIDE 50 MILLIGRAM(S): 50 TABLET ORAL at 03:06

## 2018-07-31 RX ADMIN — LOSARTAN POTASSIUM 100 MILLIGRAM(S): 100 TABLET, FILM COATED ORAL at 18:04

## 2018-07-31 RX ADMIN — SENNA PLUS 2 TABLET(S): 8.6 TABLET ORAL at 21:30

## 2018-07-31 RX ADMIN — Medication 1 TABLET(S): at 21:30

## 2018-07-31 RX ADMIN — POLYETHYLENE GLYCOL 3350 17 GRAM(S): 17 POWDER, FOR SOLUTION ORAL at 12:11

## 2018-07-31 RX ADMIN — LOSARTAN POTASSIUM 100 MILLIGRAM(S): 100 TABLET, FILM COATED ORAL at 06:17

## 2018-07-31 RX ADMIN — Medication 400 MILLIGRAM(S): at 06:13

## 2018-07-31 RX ADMIN — Medication 0.5 MILLIGRAM(S): at 09:30

## 2018-07-31 RX ADMIN — Medication 100 MILLIGRAM(S): at 07:28

## 2018-07-31 RX ADMIN — Medication 975 MILLIGRAM(S): at 21:30

## 2018-07-31 RX ADMIN — Medication 30 MILLIGRAM(S): at 06:13

## 2018-07-31 RX ADMIN — TRAMADOL HYDROCHLORIDE 50 MILLIGRAM(S): 50 TABLET ORAL at 22:00

## 2018-07-31 RX ADMIN — Medication 975 MILLIGRAM(S): at 22:00

## 2018-07-31 RX ADMIN — WARFARIN SODIUM 5 MILLIGRAM(S): 2.5 TABLET ORAL at 21:30

## 2018-07-31 NOTE — DISCHARGE NOTE ADULT - PLAN OF CARE
surgical intervention should result in improved function Continue weight bearing as tolerated ambulation/physical therapy, maintain posterior total hip precautions

## 2018-07-31 NOTE — PHYSICAL THERAPY INITIAL EVALUATION ADULT - ADDITIONAL COMMENTS
Prior to admission pt able to perform all ADL's independently using cane. Pt reports residing in apartment with elevator (no stairs required).

## 2018-07-31 NOTE — DISCHARGE NOTE ADULT - FINDINGS/TREATMENT
Continue weight bearing as tolerated ambulation/physical therapy, maintain posterior total hip precautions

## 2018-07-31 NOTE — DISCHARGE NOTE ADULT - MEDICATION SUMMARY - MEDICATIONS TO TAKE
I will START or STAY ON the medications listed below when I get home from the hospital:    fiber well gummies  -- 1 twice a day   -- Indication: For Fiber    finasteride 5 mg oral tablet  -- 1 tab(s) by mouth once a day, in the afternoon 1PM  -- Indication: For BPH    Mobic 15 mg oral tablet  -- 1 tab(s) by mouth once a day, at night   -- Indication: For Pain    acetaminophen 325 mg oral tablet  -- 3 tab(s) by mouth every 8 hours, As Needed for mild pain/fever >100.4F  -- Indication: For Pain/fever    oxyCODONE 5 mg oral tablet  -- 1-2 tab(s) by mouth every 4-6 hours, As needed, For moderate to severe pain MDD:8  -- Indication: For Pain    traMADol 50 mg oral tablet  -- 1 tab(s) by mouth every 6 hours, As needed, Mild Pain (1 - 3) MDD:4  -- Indication: For Pain    aspirin 325 mg oral delayed release tablet  -- 1 tab(s) by mouth 2 times a day while bridging to therapeutic INR (2-3) with coumadin  -- Indication: For dvt prevention    losartan 100 mg oral tablet  -- orally 2 times a day  -- Indication: For Blood pressure    Coumadin 5 mg oral tablet  -- Take 1.5 tabs oral daily in the evening.  INR checks with blood work to titrate dosing per your PMD.  Refills per PMD.  -- Do not take this drug if you are pregnant.  It is very important that you take or use this exactly as directed.  Do not skip doses or discontinue unless directed by your doctor.  Obtain medical advice before taking any non-prescription drugs as some may affect the action of this medication.    -- Indication: For dvt prevention    clonazePAM 0.5 mg oral tablet  -- one and half pill at 12 noon and one and half at 7 pm   -- Indication: For anxiety    probenecid 500 mg oral tablet  -- once a day in the afternoon   -- Indication: For Gout    Ambien 10 mg oral tablet  -- 1 tab(s) by mouth once a day (at bedtime)  -- Indication: For Sleep aid    clobetasol 0.05% topical cream  -- PRN for hands eczema   -- Indication: For Skin cream    Eucrisa 2% topical ointment  -- PRN  -- Indication: For Skin cream    halobetasol 0.05% topical ointment  -- Apply on skin to affected area 2 times a day, PRN  -- Indication: For Skin cream    Metamucil 1.7 g oral wafer  -- Indication: For Fiber    senna oral tablet  -- 2 tab(s) by mouth once a day (at bedtime), As needed, Constipation  -- Indication: For laxative    docusate sodium 100 mg oral capsule  -- 1 cap(s) by mouth 3 times a day  -- Indication: For Stool softener    pantoprazole 40 mg oral delayed release tablet  -- 1 tab(s) by mouth once a day (before a meal)  -- Indication: For GI prevention    Multiple Vitamins oral tablet  -- 1 tab(s) by mouth once a day  -- Indication: For Supplement

## 2018-07-31 NOTE — OCCUPATIONAL THERAPY INITIAL EVALUATION ADULT - LIVES WITH, PROFILE
spouse/Pt lives in apartment with wife, tub in bathroom. Pt I in ADLs and ambulation prior to admission

## 2018-07-31 NOTE — DISCHARGE NOTE ADULT - NS AS ACTIVITY OBS
Do not make important decisions/Do not drive or operate machinery/Walking-Outdoors allowed/Stairs allowed/No Heavy lifting/straining/Walking-Indoors allowed/Continue weight bearing as tolerated ambulation/physical therapy, maintain posterior total hip precautions

## 2018-07-31 NOTE — OCCUPATIONAL THERAPY INITIAL EVALUATION ADULT - ANTICIPATED DISCHARGE DISPOSITION, OT EVAL
for ADLs and safety assessment in home environment. Assist with ADLs as needed from family, pt owns sock aid/home w/ OT

## 2018-07-31 NOTE — OCCUPATIONAL THERAPY INITIAL EVALUATION ADULT - ADDITIONAL COMMENTS
Xray Pelvis: Patient is status post right total replacement arthroplasty with single pelvic stabilization screw.  Severe degenerative changes of the left femoral acetabular joint. No evidence of periprosthetic fracture or dislocation.

## 2018-07-31 NOTE — DISCHARGE NOTE ADULT - ADDITIONAL INSTRUCTIONS
You will require follow up with Dr. Springer for INR management.  Please call his office at [phone] to arrange for blood work.  Office draws blood M-F 9am-245 PM. You will require follow up with Dr. Springer for INR management.  Please call his office at [phone] to arrange for blood work.  Office draws blood M-F 9am-245 PM. Keep surgical incision/Aquacel dressing clean and dry. Follow with Dr Denney in his office post operative day #14 (8/13/18) for wound check and surgical staple removal. Continue weight bearing as tolerated ambulation/physical therapy, maintain posterior total hip precautions You will require follow up with Dr. Springer for management of Coumadin/ INR checks.  You will take aspirin until INR is therapeutic.  Please call his office at [phone] to arrange for blood work.  Call immediately on discharge.  Office draws blood M-F 9am-245 PM. Keep surgical incision/Aquacel dressing clean and dry. Follow with Dr Denney in his office post operative day #14 (8/13/18) for wound check and surgical staple removal. Continue weight bearing as tolerated ambulation/physical therapy, maintain posterior total hip precautions

## 2018-07-31 NOTE — DISCHARGE NOTE ADULT - MEDICATION SUMMARY - MEDICATIONS TO CHANGE
I will SWITCH the dose or number of times a day I take the medications listed below when I get home from the hospital:    acetaminophen 500 mg oral tablet  -- daily

## 2018-07-31 NOTE — OCCUPATIONAL THERAPY INITIAL EVALUATION ADULT - PERTINENT HX OF CURRENT PROBLEM, REHAB EVAL
56 yo M with h/o HTN, BRITTNY, obesity, BPH, anxiety disorders, gout, is being seen in preadmission testing in preparation for R total hip replacement on July 30, 2018.

## 2018-07-31 NOTE — PROGRESS NOTE ADULT - SUBJECTIVE AND OBJECTIVE BOX
Patient seen and examined. Pain controlled.    Physical exam  VS: Afebrile, vital signs stable  Gen: NAD  Right LE: Dressing clean, dry, and intact. +EHL/FHL/TA/GSC. SILT L3-S1. +Dorsalis pedis pulse, capillary refill brisk. Compartments soft and nontender.      57M s/p right total hip arthroplasty POD# 1    Weight bear as tolerated with posterior hip precautions  Abduction while supine/seated  Pain control  DVT prophylaxis  Physical therapy  Discharge planning

## 2018-07-31 NOTE — DISCHARGE NOTE ADULT - CARE PLAN
Principal Discharge DX:	Hip osteoarthritis  Goal:	surgical intervention should result in improved function  Assessment and plan of treatment:	Continue weight bearing as tolerated ambulation/physical therapy, maintain posterior total hip precautions

## 2018-07-31 NOTE — CHART NOTE - NSCHARTNOTEFT_GEN_A_CORE
Spoke with Dr. Springer, Internist for Mr. Thomason, to arrange for INR checks with office once discharged.  Patient to call office to arrange for blood work.  Office draws blood M-F 9am-245 PM.     BRIANNA Salazar PA-C  #[id number]

## 2018-07-31 NOTE — PHYSICAL THERAPY INITIAL EVALUATION ADULT - GENERAL OBSERVATIONS, REHAB EVAL
Pt received seated in chair, A&Ox , agreeable to physical therapy nishi buckley 30 min. Pt received seated in chair, A&Ox4, agreeable to physical therapy nishi buckley 30 min.

## 2018-07-31 NOTE — PHYSICAL THERAPY INITIAL EVALUATION ADULT - STRENGTHENING, PT EVAL
GOAL: Pt will improve RLE strength to 4+/5, for increased limb stability, to improve gait in 2 weeks.

## 2018-07-31 NOTE — DISCHARGE NOTE ADULT - HOSPITAL COURSE
Chief Complaint/Reason for Visit/HPI:   Reason for Admission	"Hip pain for years, I fell years ago."	     History of Present Illness:  History of Present Illness		  57 year old male with h/o HTN, BRITTNY, obesity, BPH, anxiety disorders, gout, is being seen in preadmission testing in preparation for right total hip replacement on July 30, 2018.    Allergies/Medications:   Allergies:        Allergies:  	No Known Allergies:        Intolerances:  	Levaquin: Drug, Other, "I couldn't sit still, I couldn't stay alone, I  had the shakes"  	erythromycin: Drug, Other, "I took erythromycin ointment- then I had eye infection"  	codeine: Drug, Nausea, "it makes me crazy"    Home Medications:   * Patient Currently Takes Medications as of 23-Jul-2018 09:56 documented in Structured Notes  · 	finasteride 5 mg oral tablet: Last Dose Taken:  , 1 tab(s) orally once a day, in the afternoon 1PM  · 	losartan 50 mg oral tablet: Last Dose Taken:  , 1 tab(s) orally once a day  · 	probenecid 500 mg oral tablet: once a day in the afternoon   · 	clonazePAM 0.5 mg oral tablet: Last Dose Taken:  , one and half pill at 12 noon and one and half at 7 pm   · 	Ambien 10 mg oral tablet: Last Dose Taken:  , 1 tab(s) orally once a day (at bedtime)  · 	Mobic 15 mg oral tablet: 1 tab(s) orally once a day, at night   · 	acetaminophen 500 mg oral tablet: Last Dose Taken:  , daily   · 	Metamucil 1.7 g oral wafer:   · 	fiber well gummies: Last Dose Taken:  , 1 twice a day   · 	clobetasol 0.05% topical cream: Last Dose Taken:  , PRN for hands eczema   · 	Eucrisa 2% topical ointment: Last Dose Taken:  , PRN  · 	halobetasol 0.05% topical ointment: Apply topically to affected area 2 times a day, PRN    PMH/PSH/FH/SH:    Past Medical History:  Back pain  lumbar  Benign prostatic hypertrophy    Eczema    Gout  last attack more than 5 years ago  Hip osteoarthritis    Hypertension    Neuropathy of hand    Obesity    BRITTNY on CPAP    Shoulder pain, bilateral.     Past Surgical History:  Fistula  repair  H/O colonoscopy  2-3 years ago  S/P tonsillectomy.    Hospital Course:  56 y/o M underwent Right total hip arthroplasty on 7/30/18 with Dr.S. Denney.  Patient tolerated procedure well.  Patient was evaluated postoperatively by physical and occupational therapists for weight bearing as tolerated ambulation and cleared patient for discharge home with home physical therapy.  Patient advised to keep surgical incision/dressing clean and dry, and have follow up with Dr. Denney post operative day #14 (8/13/18) for wound check and surgical staple removal. Patient is to resume  Coumadin and follow up with his primary care provider Dr. Castro for blood draws to check INR in 1-2 days following discharge.

## 2018-07-31 NOTE — DISCHARGE NOTE ADULT - CARE PROVIDER_API CALL
Richie Denney), Orthopaedic Surgery  [address]  [address]  [place] [address]  Phone: [phone]  Fax: [phone]    Jeremiah Springer), Internal Medicine  [id number]  [address] [place] [address]  Phone: [phone]  Fax: [phone]

## 2018-07-31 NOTE — DISCHARGE NOTE ADULT - PATIENT PORTAL LINK FT
You can access the [facility] Patient Portal, offered by [facility], by registering with the following website: [email or web address]

## 2018-07-31 NOTE — PHYSICAL THERAPY INITIAL EVALUATION ADULT - DIAGNOSIS, PT EVAL
Pt with decreased strength, endurance and balance leading to mild impairment in functional mobility.

## 2018-08-01 LAB
INR BLD: 1.06 RATIO — SIGNIFICANT CHANGE UP (ref 0.88–1.16)
PROTHROM AB SERPL-ACNC: 12 SEC — SIGNIFICANT CHANGE UP (ref 10–13.1)

## 2018-08-01 RX ORDER — LOSARTAN POTASSIUM 100 MG/1
50 TABLET, FILM COATED ORAL
Qty: 0 | Refills: 0 | Status: DISCONTINUED | OUTPATIENT
Start: 2018-08-01 | End: 2018-08-02

## 2018-08-01 RX ORDER — WARFARIN SODIUM 2.5 MG/1
7.5 TABLET ORAL ONCE
Qty: 0 | Refills: 0 | Status: COMPLETED | OUTPATIENT
Start: 2018-08-01 | End: 2018-08-01

## 2018-08-01 RX ADMIN — Medication 975 MILLIGRAM(S): at 13:40

## 2018-08-01 RX ADMIN — Medication 975 MILLIGRAM(S): at 06:10

## 2018-08-01 RX ADMIN — CELECOXIB 200 MILLIGRAM(S): 200 CAPSULE ORAL at 06:09

## 2018-08-01 RX ADMIN — Medication 975 MILLIGRAM(S): at 21:57

## 2018-08-01 RX ADMIN — Medication 325 MILLIGRAM(S): at 06:08

## 2018-08-01 RX ADMIN — Medication 1 TABLET(S): at 06:08

## 2018-08-01 RX ADMIN — POLYETHYLENE GLYCOL 3350 17 GRAM(S): 17 POWDER, FOR SOLUTION ORAL at 11:30

## 2018-08-01 RX ADMIN — FINASTERIDE 5 MILLIGRAM(S): 5 TABLET, FILM COATED ORAL at 11:30

## 2018-08-01 RX ADMIN — WARFARIN SODIUM 7.5 MILLIGRAM(S): 2.5 TABLET ORAL at 21:57

## 2018-08-01 RX ADMIN — Medication 500 MILLIGRAM(S): at 11:30

## 2018-08-01 RX ADMIN — Medication 975 MILLIGRAM(S): at 06:09

## 2018-08-01 RX ADMIN — PANTOPRAZOLE SODIUM 40 MILLIGRAM(S): 20 TABLET, DELAYED RELEASE ORAL at 06:08

## 2018-08-01 RX ADMIN — Medication 1 TABLET(S): at 11:30

## 2018-08-01 RX ADMIN — Medication 0.75 MILLIGRAM(S): at 12:03

## 2018-08-01 RX ADMIN — Medication 1 TABLET(S): at 21:57

## 2018-08-01 RX ADMIN — CELECOXIB 200 MILLIGRAM(S): 200 CAPSULE ORAL at 06:10

## 2018-08-01 RX ADMIN — Medication 0.75 MILLIGRAM(S): at 20:11

## 2018-08-01 RX ADMIN — Medication 975 MILLIGRAM(S): at 13:08

## 2018-08-01 RX ADMIN — Medication 100 MILLIGRAM(S): at 21:57

## 2018-08-01 RX ADMIN — Medication 1 TABLET(S): at 13:08

## 2018-08-01 RX ADMIN — LOSARTAN POTASSIUM 100 MILLIGRAM(S): 100 TABLET, FILM COATED ORAL at 06:08

## 2018-08-01 RX ADMIN — LOSARTAN POTASSIUM 50 MILLIGRAM(S): 100 TABLET, FILM COATED ORAL at 18:43

## 2018-08-01 RX ADMIN — Medication 100 MILLIGRAM(S): at 06:08

## 2018-08-01 RX ADMIN — Medication 975 MILLIGRAM(S): at 22:57

## 2018-08-01 RX ADMIN — Medication 325 MILLIGRAM(S): at 17:26

## 2018-08-01 RX ADMIN — Medication 100 MILLIGRAM(S): at 13:08

## 2018-08-01 NOTE — PROGRESS NOTE ADULT - SUBJECTIVE AND OBJECTIVE BOX
Patient is a 57y old  Male who presents with a chief complaint of right hip arthritis  Patient s/p right total hip arthroplasty  POST OPERATIVE DAY #:  [2 ]   Patient comfortable  No complaints    T(C): 36.8 (08-01-18 @ 05:10), Max: 36.8 (08-01-18 @ 05:10)  HR: 81 (08-01-18 @ 05:10) (73 - 81)  BP: 130/69 (08-01-18 @ 05:10) (124/64 - 151/75)  RR: 18 (08-01-18 @ 05:10) (18 - 18)  SpO2: 97% (08-01-18 @ 05:10) (95% - 98%)    PHYSICAL EXAM:  NAD, Alert  [Right ] Hip: Dressing C/D/I; sensation grossly intact to light touch; (+) DF/PF; (+) Distal Pulses; No Calf tenderness B/L, PAS     LABS:               11.7   13.21 )-----------( 264      ( 31 Jul 2018 08:34 )            35.3     07-3  137  |  102  |  19  ----------------------------<  134<H>  4.6   |  22  |  1.30  Ca    8.1<L>      31 Jul 2018 06:51  PT/INR - ( 31 Jul 2018 08:38 )   PT: 11.6 sec;   INR: 1.03 ratio

## 2018-08-01 NOTE — PROGRESS NOTE ADULT - ASSESSMENT
56 y/o M s/p right total hip arthroplasty  POD#2, physical therapy, posterior total hip precautions  Marian Joyce PA-C  Orthopaedic Surgery  Team pager [phone]  [facility] [phone]  [phone]

## 2018-08-01 NOTE — PROGRESS NOTE ADULT - ATTENDING COMMENTS
Patient is a 57y old  Male who presents with a chief complaint of right hip arthritis (30 Jul 2018 11:50)        Agree with last Resident or Physician's Assistant note:    Patient comfortable  No complaints    PHYSICAL EXAM:  NAD, Alert    [ ] Hip: Dressing C/D/I; sensation grossly intact to light touch; (+) DF/PF; (+) Distal Pulses; No Calf tenderness B/L, PAS     Plan for physical therapy to get out of bed, ambulate full weight bearing as tolerated. work on knee ROM          Plan for Disposition:  home care      Richie Denney MD  [facility]  [phone]
Patient is a 57y old  Male who presents with a chief complaint of right hip arthritis (31 Jul 2018 11:34)        Agree with last Resident or Physician's Assistant note:    Patient comfortable  No complaints    PHYSICAL EXAM:  NAD, Alert    [ ] Hip: Dressing C/D/I; sensation grossly intact to light touch; (+) DF/PF; (+) Distal Pulses; No Calf tenderness B/L, PAS         Physical therapy. Patient is ambulating, progressing with ROM, sitting comfortably.       Plan for Disposition:  Work on Physical therapy today , eval for home care.      Richie Denney MD  [facility]  [phone]

## 2018-08-02 VITALS
HEART RATE: 78 BPM | TEMPERATURE: 98 F | RESPIRATION RATE: 18 BRPM | SYSTOLIC BLOOD PRESSURE: 161 MMHG | DIASTOLIC BLOOD PRESSURE: 85 MMHG | OXYGEN SATURATION: 96 %

## 2018-08-02 LAB
INR BLD: 1.11 RATIO — SIGNIFICANT CHANGE UP (ref 0.88–1.16)
PROTHROM AB SERPL-ACNC: 12 SEC — SIGNIFICANT CHANGE UP (ref 9.8–12.7)

## 2018-08-02 PROCEDURE — 86900 BLOOD TYPING SEROLOGIC ABO: CPT

## 2018-08-02 PROCEDURE — 85027 COMPLETE CBC AUTOMATED: CPT

## 2018-08-02 PROCEDURE — 88311 DECALCIFY TISSUE: CPT

## 2018-08-02 PROCEDURE — 82962 GLUCOSE BLOOD TEST: CPT

## 2018-08-02 PROCEDURE — C1776: CPT

## 2018-08-02 PROCEDURE — 97161 PT EVAL LOW COMPLEX 20 MIN: CPT

## 2018-08-02 PROCEDURE — C1713: CPT

## 2018-08-02 PROCEDURE — 97535 SELF CARE MNGMENT TRAINING: CPT

## 2018-08-02 PROCEDURE — 86901 BLOOD TYPING SEROLOGIC RH(D): CPT

## 2018-08-02 PROCEDURE — 97165 OT EVAL LOW COMPLEX 30 MIN: CPT

## 2018-08-02 PROCEDURE — 80048 BASIC METABOLIC PNL TOTAL CA: CPT

## 2018-08-02 PROCEDURE — 73501 X-RAY EXAM HIP UNI 1 VIEW: CPT

## 2018-08-02 PROCEDURE — 88305 TISSUE EXAM BY PATHOLOGIST: CPT

## 2018-08-02 PROCEDURE — 85610 PROTHROMBIN TIME: CPT

## 2018-08-02 PROCEDURE — 72170 X-RAY EXAM OF PELVIS: CPT

## 2018-08-02 PROCEDURE — 97116 GAIT TRAINING THERAPY: CPT

## 2018-08-02 PROCEDURE — 97530 THERAPEUTIC ACTIVITIES: CPT

## 2018-08-02 RX ORDER — ACETAMINOPHEN 500 MG
0 TABLET ORAL
Qty: 0 | Refills: 0 | COMMUNITY

## 2018-08-02 RX ORDER — TRAMADOL HYDROCHLORIDE 50 MG/1
1 TABLET ORAL
Qty: 28 | Refills: 0 | OUTPATIENT
Start: 2018-08-02

## 2018-08-02 RX ORDER — ASPIRIN/CALCIUM CARB/MAGNESIUM 324 MG
1 TABLET ORAL
Qty: 84 | Refills: 0 | OUTPATIENT
Start: 2018-08-02 | End: 2018-09-12

## 2018-08-02 RX ORDER — DOCUSATE SODIUM 100 MG
1 CAPSULE ORAL
Qty: 0 | Refills: 0 | COMMUNITY
Start: 2018-08-02

## 2018-08-02 RX ORDER — OXYCODONE HYDROCHLORIDE 5 MG/1
1 TABLET ORAL
Qty: 70 | Refills: 0 | OUTPATIENT
Start: 2018-08-02

## 2018-08-02 RX ORDER — WARFARIN SODIUM 2.5 MG/1
1 TABLET ORAL
Qty: 30 | Refills: 0 | OUTPATIENT
Start: 2018-08-02 | End: 2018-08-31

## 2018-08-02 RX ORDER — SENNA PLUS 8.6 MG/1
2 TABLET ORAL
Qty: 0 | Refills: 0 | COMMUNITY
Start: 2018-08-02

## 2018-08-02 RX ORDER — PANTOPRAZOLE SODIUM 20 MG/1
1 TABLET, DELAYED RELEASE ORAL
Qty: 30 | Refills: 0 | OUTPATIENT
Start: 2018-08-02 | End: 2018-08-31

## 2018-08-02 RX ORDER — ACETAMINOPHEN 500 MG
3 TABLET ORAL
Qty: 0 | Refills: 0 | DISCHARGE
Start: 2018-08-02

## 2018-08-02 RX ADMIN — Medication 975 MILLIGRAM(S): at 13:40

## 2018-08-02 RX ADMIN — Medication 100 MILLIGRAM(S): at 13:11

## 2018-08-02 RX ADMIN — Medication 1 TABLET(S): at 13:10

## 2018-08-02 RX ADMIN — Medication 975 MILLIGRAM(S): at 07:04

## 2018-08-02 RX ADMIN — FINASTERIDE 5 MILLIGRAM(S): 5 TABLET, FILM COATED ORAL at 11:15

## 2018-08-02 RX ADMIN — LOSARTAN POTASSIUM 50 MILLIGRAM(S): 100 TABLET, FILM COATED ORAL at 06:25

## 2018-08-02 RX ADMIN — Medication 1 TABLET(S): at 06:25

## 2018-08-02 RX ADMIN — Medication 500 MILLIGRAM(S): at 11:11

## 2018-08-02 RX ADMIN — Medication 0.75 MILLIGRAM(S): at 11:21

## 2018-08-02 RX ADMIN — Medication 975 MILLIGRAM(S): at 13:10

## 2018-08-02 RX ADMIN — POLYETHYLENE GLYCOL 3350 17 GRAM(S): 17 POWDER, FOR SOLUTION ORAL at 11:12

## 2018-08-02 RX ADMIN — Medication 1 TABLET(S): at 11:11

## 2018-08-02 RX ADMIN — Medication 325 MILLIGRAM(S): at 06:26

## 2018-08-02 RX ADMIN — PANTOPRAZOLE SODIUM 40 MILLIGRAM(S): 20 TABLET, DELAYED RELEASE ORAL at 06:24

## 2018-08-02 RX ADMIN — Medication 975 MILLIGRAM(S): at 06:24

## 2018-08-02 RX ADMIN — Medication 100 MILLIGRAM(S): at 06:24

## 2018-08-02 NOTE — PROGRESS NOTE ADULT - ASSESSMENT
56 y/o M s/p right total hip arthroplasty 7/30  POD#3    Plan  - Home today  - Coumadin (ASA bridge)  - physical therapy, WBAT  - posterior total hip precautions  - outpatient INR checks with PMD

## 2018-08-02 NOTE — PROGRESS NOTE ADULT - SUBJECTIVE AND OBJECTIVE BOX
Ortho Progress Note    S: Patient seen and examined. No acute events overnight. Pain well controlled with current regimen. No new complaints     O:  Physical Exam:  Gen: Laying in bed, NAD, alert and oriented, CPAP machine   Resp: Unlabored breathing  Ext: EHL/FHL/TA/Sol intact          + SILT DP/SP/ROSAS/Sa          +DP, extremity WWP    Vital Signs Last 24 Hrs  T(C): 36.7 (02 Aug 2018 05:53), Max: 36.7 (01 Aug 2018 09:44)  T(F): 98 (02 Aug 2018 05:53), Max: 98 (01 Aug 2018 09:44)  HR: 80 (02 Aug 2018 05:53) (61 - 83)  BP: 152/74 (02 Aug 2018 05:53) (116/73 - 166/80)  BP(mean): --  RR: 18 (02 Aug 2018 05:53) (18 - 18)  SpO2: 98% (02 Aug 2018 05:53) (96% - 99%)                          11.7   13.21 )-----------( 264      ( 31 Jul 2018 08:34 )             35.3       07-31    137  |  102  |  19  ----------------------------<  134<H>  4.6   |  22  |  1.30        PT/INR - ( 01 Aug 2018 08:56 )   PT: 12.0 sec;   INR: 1.06 ratio

## 2018-08-03 ENCOUNTER — EMERGENCY (EMERGENCY)
Facility: HOSPITAL | Age: 58
LOS: 1 days | Discharge: HOME | End: 2018-08-03
Attending: EMERGENCY MEDICINE
Payer: MEDICARE

## 2018-08-03 VITALS
DIASTOLIC BLOOD PRESSURE: 91 MMHG | HEART RATE: 103 BPM | WEIGHT: 304.9 LBS | TEMPERATURE: 98 F | SYSTOLIC BLOOD PRESSURE: 167 MMHG | RESPIRATION RATE: 20 BRPM | OXYGEN SATURATION: 97 %

## 2018-08-03 VITALS
HEART RATE: 80 BPM | RESPIRATION RATE: 18 BRPM | DIASTOLIC BLOOD PRESSURE: 73 MMHG | SYSTOLIC BLOOD PRESSURE: 151 MMHG | OXYGEN SATURATION: 95 %

## 2018-08-03 DIAGNOSIS — Z90.89 ACQUIRED ABSENCE OF OTHER ORGANS: Chronic | ICD-10-CM

## 2018-08-03 DIAGNOSIS — Z98.890 OTHER SPECIFIED POSTPROCEDURAL STATES: Chronic | ICD-10-CM

## 2018-08-03 DIAGNOSIS — L98.8 OTHER SPECIFIED DISORDERS OF THE SKIN AND SUBCUTANEOUS TISSUE: Chronic | ICD-10-CM

## 2018-08-03 LAB
ALBUMIN SERPL ELPH-MCNC: 4.1 G/DL — SIGNIFICANT CHANGE UP (ref 3.3–5)
ALP SERPL-CCNC: 157 U/L — HIGH (ref 40–120)
ALT FLD-CCNC: 53 U/L — HIGH (ref 10–45)
ANION GAP SERPL CALC-SCNC: 14 MMOL/L — SIGNIFICANT CHANGE UP (ref 5–17)
APTT BLD: 28.3 SEC — SIGNIFICANT CHANGE UP (ref 27.5–37.4)
AST SERPL-CCNC: 64 U/L — HIGH (ref 10–40)
BASOPHILS # BLD AUTO: 0.1 K/UL — SIGNIFICANT CHANGE UP (ref 0–0.2)
BASOPHILS NFR BLD AUTO: 0.5 % — SIGNIFICANT CHANGE UP (ref 0–2)
BILIRUB SERPL-MCNC: 0.6 MG/DL — SIGNIFICANT CHANGE UP (ref 0.2–1.2)
BUN SERPL-MCNC: 24 MG/DL — HIGH (ref 7–23)
CALCIUM SERPL-MCNC: 9.4 MG/DL — SIGNIFICANT CHANGE UP (ref 8.4–10.5)
CHLORIDE SERPL-SCNC: 101 MMOL/L — SIGNIFICANT CHANGE UP (ref 96–108)
CO2 SERPL-SCNC: 24 MMOL/L — SIGNIFICANT CHANGE UP (ref 22–31)
CREAT SERPL-MCNC: 1.25 MG/DL — SIGNIFICANT CHANGE UP (ref 0.5–1.3)
EOSINOPHIL # BLD AUTO: 0.2 K/UL — SIGNIFICANT CHANGE UP (ref 0–0.5)
EOSINOPHIL NFR BLD AUTO: 2 % — SIGNIFICANT CHANGE UP (ref 0–6)
GLUCOSE SERPL-MCNC: 90 MG/DL — SIGNIFICANT CHANGE UP (ref 70–99)
HCT VFR BLD CALC: 31.9 % — LOW (ref 39–50)
HGB BLD-MCNC: 10.6 G/DL — LOW (ref 13–17)
INR BLD: 1.25 RATIO — HIGH (ref 0.88–1.16)
LYMPHOCYTES # BLD AUTO: 2.8 K/UL — SIGNIFICANT CHANGE UP (ref 1–3.3)
LYMPHOCYTES # BLD AUTO: 23.5 % — SIGNIFICANT CHANGE UP (ref 13–44)
MCHC RBC-ENTMCNC: 29.2 PG — SIGNIFICANT CHANGE UP (ref 27–34)
MCHC RBC-ENTMCNC: 33.3 GM/DL — SIGNIFICANT CHANGE UP (ref 32–36)
MCV RBC AUTO: 87.8 FL — SIGNIFICANT CHANGE UP (ref 80–100)
MONOCYTES # BLD AUTO: 1 K/UL — HIGH (ref 0–0.9)
MONOCYTES NFR BLD AUTO: 8.3 % — SIGNIFICANT CHANGE UP (ref 2–14)
NEUTROPHILS # BLD AUTO: 7.7 K/UL — HIGH (ref 1.8–7.4)
NEUTROPHILS NFR BLD AUTO: 65.7 % — SIGNIFICANT CHANGE UP (ref 43–77)
PLATELET # BLD AUTO: 343 K/UL — SIGNIFICANT CHANGE UP (ref 150–400)
POTASSIUM SERPL-MCNC: 4.3 MMOL/L — SIGNIFICANT CHANGE UP (ref 3.5–5.3)
POTASSIUM SERPL-SCNC: 4.3 MMOL/L — SIGNIFICANT CHANGE UP (ref 3.5–5.3)
PROT SERPL-MCNC: 7.6 G/DL — SIGNIFICANT CHANGE UP (ref 6–8.3)
PROTHROM AB SERPL-ACNC: 13.6 SEC — HIGH (ref 9.8–12.7)
RBC # BLD: 3.63 M/UL — LOW (ref 4.2–5.8)
RBC # FLD: 12 % — SIGNIFICANT CHANGE UP (ref 10.3–14.5)
SODIUM SERPL-SCNC: 139 MMOL/L — SIGNIFICANT CHANGE UP (ref 135–145)
SURGICAL PATHOLOGY STUDY: SIGNIFICANT CHANGE UP
WBC # BLD: 11.7 K/UL — HIGH (ref 3.8–10.5)
WBC # FLD AUTO: 11.7 K/UL — HIGH (ref 3.8–10.5)

## 2018-08-03 PROCEDURE — 99284 EMERGENCY DEPT VISIT MOD MDM: CPT | Mod: GC

## 2018-08-03 PROCEDURE — 93971 EXTREMITY STUDY: CPT | Mod: 26

## 2018-08-03 RX ORDER — TRAMADOL HYDROCHLORIDE 50 MG/1
50 TABLET ORAL ONCE
Qty: 0 | Refills: 0 | Status: DISCONTINUED | OUTPATIENT
Start: 2018-08-03 | End: 2018-08-03

## 2018-08-03 RX ORDER — WARFARIN SODIUM 2.5 MG/1
10 TABLET ORAL ONCE
Qty: 0 | Refills: 0 | Status: COMPLETED | OUTPATIENT
Start: 2018-08-03 | End: 2018-08-03

## 2018-08-03 RX ORDER — ASPIRIN/CALCIUM CARB/MAGNESIUM 324 MG
325 TABLET ORAL ONCE
Qty: 0 | Refills: 0 | Status: COMPLETED | OUTPATIENT
Start: 2018-08-03 | End: 2018-08-03

## 2018-08-03 RX ORDER — LOSARTAN POTASSIUM 100 MG/1
50 TABLET, FILM COATED ORAL DAILY
Qty: 0 | Refills: 0 | Status: DISCONTINUED | OUTPATIENT
Start: 2018-08-03 | End: 2018-08-03

## 2018-08-03 RX ADMIN — LOSARTAN POTASSIUM 50 MILLIGRAM(S): 100 TABLET, FILM COATED ORAL at 22:14

## 2018-08-03 NOTE — ED PROVIDER NOTE - PROGRESS NOTE DETAILS
Results reviewed with patient at bedside and provided with copy of result. Patient will received planned 10 mg dose and should repeat exam in 5-7 days, patient aware.

## 2018-08-03 NOTE — ED PROVIDER NOTE - PMH
Back pain  lumbar  Benign prostatic hypertrophy    Eczema    Gout  last attack more than 5 years ago  Hip osteoarthritis    Hypertension    Neuropathy of hand    Obesity    BRITTNY on CPAP    Shoulder pain, bilateral

## 2018-08-03 NOTE — ED PROVIDER NOTE - OBJECTIVE STATEMENT
attending Pawel: 57yM s/p total R hip replacement 5 days ago p/w 1 day worsening RLE swelling, sent in by orthopedist to eval for DVT. On prophylactic coumadin with reportedly subtherapeutic INR today. Denies chest pain or SOB. Ambulatory with walker.

## 2018-08-03 NOTE — ED PROVIDER NOTE - ATTENDING CONTRIBUTION TO CARE
attending Pawel: 57yM s/p total R hip replacement 5 days ago p/w 1 day worsening RLE swelling, sent in by orthopedist to eval for DVT. On prophylactic coumadin with reportedly subtherapeutic INR today. Denies chest pain or SOB. Ambulatory with walker. On exam, well-appearing, obese, RLE with 2+ edema from foot to mid thigh, 2+ DP pulses bilaterally, no overlying warmth/erythema. Will obtain labs, LE duplex eval for DVT and reassess

## 2018-08-03 NOTE — ED CLERICAL - NS ED CLERK NOTE PRE-ARRIVAL INFORMATION; ADDITIONAL PRE-ARRIVAL INFORMATION
This patient is enrolled in the comprehensive joint replacement (CJR) program and has active care navigation.  This patient can be followed up by the care navigation team within 24 hours. To arrange close follow-up or to obtain additional clinical information about this patient, please call the contact number above.   Please call the orthopedic resident ([phone]) for ALL patients who are admitted or placed in observation.

## 2018-08-04 PROBLEM — M54.9 DORSALGIA, UNSPECIFIED: Chronic | Status: ACTIVE | Noted: 2018-07-23

## 2018-08-04 PROBLEM — G56.90 UNSPECIFIED MONONEUROPATHY OF UNSPECIFIED UPPER LIMB: Chronic | Status: ACTIVE | Noted: 2018-07-23

## 2018-08-04 PROBLEM — M10.9 GOUT, UNSPECIFIED: Chronic | Status: ACTIVE | Noted: 2018-07-23

## 2018-08-04 PROBLEM — M16.9 OSTEOARTHRITIS OF HIP, UNSPECIFIED: Chronic | Status: ACTIVE | Noted: 2018-07-23

## 2018-08-04 PROBLEM — M25.511 PAIN IN RIGHT SHOULDER: Chronic | Status: ACTIVE | Noted: 2018-07-23

## 2018-08-04 PROCEDURE — 85730 THROMBOPLASTIN TIME PARTIAL: CPT

## 2018-08-04 PROCEDURE — 93971 EXTREMITY STUDY: CPT

## 2018-08-04 PROCEDURE — 99284 EMERGENCY DEPT VISIT MOD MDM: CPT | Mod: 25

## 2018-08-04 PROCEDURE — 85027 COMPLETE CBC AUTOMATED: CPT

## 2018-08-04 PROCEDURE — 80053 COMPREHEN METABOLIC PANEL: CPT

## 2018-08-04 PROCEDURE — 85610 PROTHROMBIN TIME: CPT

## 2018-08-04 RX ADMIN — Medication 325 MILLIGRAM(S): at 00:11

## 2018-08-04 RX ADMIN — TRAMADOL HYDROCHLORIDE 50 MILLIGRAM(S): 50 TABLET ORAL at 00:11

## 2018-08-04 RX ADMIN — WARFARIN SODIUM 10 MILLIGRAM(S): 2.5 TABLET ORAL at 00:19

## 2018-08-04 NOTE — ED ADULT NURSE NOTE - PRIMARY CARE PROVIDER
? You can access the [facility] Patient Portal, offered by [facility], by registering with the following website: [email or web address]

## 2018-08-04 NOTE — ED ADULT NURSE NOTE - NSIMPLEMENTINTERV_GEN_ALL_ED
Implemented All Universal Safety Interventions:  [place] to call system. Call bell, personal items and telephone within reach. Instruct patient to call for assistance. Room bathroom lighting operational. Non-slip footwear when patient is off stretcher. Physically safe environment: no spills, clutter or unnecessary equipment. Stretcher in lowest position, wheels locked, appropriate side rails in place.

## 2018-08-06 ENCOUNTER — INBOUND DOCUMENT (OUTPATIENT)
Age: 58
End: 2018-08-06

## 2018-08-15 ENCOUNTER — APPOINTMENT (OUTPATIENT)
Dept: ORTHOPEDIC SURGERY | Facility: CLINIC | Age: 58
End: 2018-08-15
Payer: MEDICARE

## 2018-08-15 DIAGNOSIS — Z96.641 PRESENCE OF RIGHT ARTIFICIAL HIP JOINT: ICD-10-CM

## 2018-08-15 PROBLEM — Z78.9 NEVER EXERCISES: Status: ACTIVE | Noted: 2018-04-25

## 2018-08-15 PROBLEM — Z86.79 HISTORY OF HYPERTENSION: Status: RESOLVED | Noted: 2018-04-25 | Resolved: 2018-08-15

## 2018-08-15 PROBLEM — Z82.61 FAMILY HISTORY OF ARTHRITIS: Status: ACTIVE | Noted: 2018-04-25

## 2018-08-15 PROBLEM — Z80.3 FAMILY HISTORY OF MALIGNANT NEOPLASM OF BREAST: Status: ACTIVE | Noted: 2018-04-25

## 2018-08-15 PROBLEM — Z82.62 FAMILY HISTORY OF OSTEOPOROSIS: Status: ACTIVE | Noted: 2018-04-25

## 2018-08-15 PROCEDURE — 73502 X-RAY EXAM HIP UNI 2-3 VIEWS: CPT | Mod: RT

## 2018-08-15 PROCEDURE — 99024 POSTOP FOLLOW-UP VISIT: CPT

## 2018-10-01 ENCOUNTER — APPOINTMENT (OUTPATIENT)
Dept: ORTHOPEDIC SURGERY | Facility: CLINIC | Age: 58
End: 2018-10-01
Payer: MEDICARE

## 2018-10-01 PROCEDURE — 73502 X-RAY EXAM HIP UNI 2-3 VIEWS: CPT | Mod: RT

## 2018-10-01 PROCEDURE — 99024 POSTOP FOLLOW-UP VISIT: CPT

## 2018-10-28 ENCOUNTER — FORM ENCOUNTER (OUTPATIENT)
Age: 58
End: 2018-10-28

## 2018-10-29 ENCOUNTER — RX RENEWAL (OUTPATIENT)
Age: 58
End: 2018-10-29

## 2018-10-29 RX ORDER — TRAMADOL HYDROCHLORIDE 50 MG/1
50 TABLET, COATED ORAL
Qty: 20 | Refills: 0 | Status: ACTIVE | COMMUNITY
Start: 2018-08-15 | End: 1900-01-01

## 2018-12-05 ENCOUNTER — APPOINTMENT (OUTPATIENT)
Dept: ORTHOPEDIC SURGERY | Facility: CLINIC | Age: 58
End: 2018-12-05
Payer: MEDICARE

## 2018-12-05 VITALS — HEIGHT: 68 IN | DIASTOLIC BLOOD PRESSURE: 81 MMHG | HEART RATE: 77 BPM | SYSTOLIC BLOOD PRESSURE: 154 MMHG

## 2018-12-05 DIAGNOSIS — M16.12 UNILATERAL PRIMARY OSTEOARTHRITIS, LEFT HIP: ICD-10-CM

## 2018-12-05 PROCEDURE — 72170 X-RAY EXAM OF PELVIS: CPT

## 2018-12-05 PROCEDURE — 99213 OFFICE O/P EST LOW 20 MIN: CPT

## 2018-12-05 RX ORDER — TRAMADOL HYDROCHLORIDE 50 MG/1
50 TABLET, COATED ORAL
Qty: 60 | Refills: 0 | Status: ACTIVE | COMMUNITY
Start: 2018-12-05 | End: 1900-01-01

## 2018-12-05 RX ORDER — MELOXICAM 15 MG/1
15 TABLET ORAL DAILY
Qty: 90 | Refills: 2 | Status: ACTIVE | COMMUNITY
Start: 2018-12-05 | End: 1900-01-01

## 2019-03-06 ENCOUNTER — APPOINTMENT (OUTPATIENT)
Dept: ORTHOPEDIC SURGERY | Facility: CLINIC | Age: 59
End: 2019-03-06
Payer: MEDICARE

## 2019-03-06 DIAGNOSIS — M70.71 OTHER BURSITIS OF HIP, RIGHT HIP: ICD-10-CM

## 2019-03-06 PROCEDURE — 20610 DRAIN/INJ JOINT/BURSA W/O US: CPT | Mod: RT

## 2019-03-06 PROCEDURE — 99213 OFFICE O/P EST LOW 20 MIN: CPT | Mod: 25

## 2019-03-06 PROCEDURE — 72170 X-RAY EXAM OF PELVIS: CPT

## 2019-03-06 NOTE — DISCUSSION/SUMMARY
[de-identified] : He is doing well with his right hip.  He had a local injection with Depo-Medrol for trochanteric bursitis.  In the future he would like to do a left total hip replacement return visit in 3 months

## 2019-03-06 NOTE — HISTORY OF PRESENT ILLNESS
[FreeTextEntry1] : s/p R THR 7/30/18. [FreeTextEntry2] : Pt is a 58 y/o male  s/p R THR 7/30/18.  He continues to have pain in the lateral portion of his hip comstantly. He no longer attends PT.  He no longer takes Tramadol because he cannot tolerate it. He is not taking anything for DVT prophylaxis.  He ambulates with a walker.\par He notes low back pain and left hip pain as well.  He is considering having a L THR in the near future.\par

## 2019-03-06 NOTE — PHYSICAL EXAM
[FreeTextEntry2] : She now is doing extremely well with his right hip his left hip has severe limitations.  He would like to wait some time still before doing his left hip but possibly in 6 months. .  His motion at this time shows right hip  flexion past 115 left hip 75 degrees, abduction right hip 60 degrees left hip 30 degrees, adduction right hip 10 degrees left hip -30 degrees, external rotation right hip 60 degrees left hip 30 degrees, internal rotation right hip 10 degrees left hip -20 degrees.  He does have pain with motion in his left hip.  His right hip has tenderness directly over the greater trochanter consistent with trochanteric bursitis.  \par \par  [de-identified] : An AP of the pelvis and a lateral of his right hip today shows a right Biomet  all porous bone ingrowth E - Poly THR in good position and well fixed. \par \par  His left hip shows a loss of femoral head height and marked destruction of superior head with cystic degeneration of the femoral head and acetabulum.

## 2019-03-06 NOTE — PROCEDURE
[de-identified] : Procedure Note: \par \par Anatomic Location: right  hip trochanteric bursitis\par \par Diagnosis:  hip trochanteric bursitis\par \par Procedure:  Injection of 6ccs of Marcaine 0.5% plain and Depo-Medrol 1cc (40 MG)\par \par Local Spray: Ethyl Chloride.\par \par Skin preparation with alcohol.\par \par Patient has consented for the procedure.\par \par Injection 22-gauge spinal needle  through an anterior  lateral approach.\par \par Patient tolerated the procedure well.\par \par Patient instructed to call the office if any reaction, fever, chills, increased erythema or swelling.   [phone].

## 2019-04-29 ENCOUNTER — APPOINTMENT (OUTPATIENT)
Dept: PLASTIC SURGERY | Facility: CLINIC | Age: 59
End: 2019-04-29
Payer: MEDICARE

## 2019-04-29 PROCEDURE — 99203 OFFICE O/P NEW LOW 30 MIN: CPT

## 2019-05-01 ENCOUNTER — OUTPATIENT (OUTPATIENT)
Dept: OUTPATIENT SERVICES | Facility: HOSPITAL | Age: 59
LOS: 1 days | End: 2019-05-01
Payer: MEDICARE

## 2019-05-01 ENCOUNTER — APPOINTMENT (OUTPATIENT)
Dept: ULTRASOUND IMAGING | Facility: CLINIC | Age: 59
End: 2019-05-01
Payer: MEDICARE

## 2019-05-01 DIAGNOSIS — L98.8 OTHER SPECIFIED DISORDERS OF THE SKIN AND SUBCUTANEOUS TISSUE: Chronic | ICD-10-CM

## 2019-05-01 DIAGNOSIS — Z98.890 OTHER SPECIFIED POSTPROCEDURAL STATES: Chronic | ICD-10-CM

## 2019-05-01 DIAGNOSIS — Z00.8 ENCOUNTER FOR OTHER GENERAL EXAMINATION: ICD-10-CM

## 2019-05-01 DIAGNOSIS — Z90.89 ACQUIRED ABSENCE OF OTHER ORGANS: Chronic | ICD-10-CM

## 2019-05-01 PROCEDURE — 76705 ECHO EXAM OF ABDOMEN: CPT

## 2019-05-01 PROCEDURE — 76705 ECHO EXAM OF ABDOMEN: CPT | Mod: 26

## 2019-05-02 NOTE — PHYSICAL EXAM
[de-identified] : NC/AT [de-identified] : NAD [de-identified] : sclerae clear [de-identified] : hearing grossly normal [de-identified] : no cyanosis [de-identified] : normal respiratory effort [de-identified] : obese, pannus with subcutaneous tract at left side superior to inguinal area with serosanguinous drainage. Minimal induration and redness, no discrete mass, no visible scars. mild tenderness [de-identified] : 2+ femoral pulse on left, no palpable inguinal lymph nodes [de-identified] : no palpable hernia on left [de-identified] : as above [de-identified] : no visible tremor [de-identified] : normal affect, appropriate

## 2019-05-02 NOTE — REVIEW OF SYSTEMS
[Anxiety] : anxiety [As Noted in HPI] : as noted in HPI [Fever] : no fever [Chills] : no chills [Eyesight Problems] : no eyesight problems [Loss Of Hearing] : no hearing loss [Chest Pain] : no chest pain [Shortness Of Breath] : no shortness of breath [Palpitations] : no palpitations [Cough] : no cough [Abdominal Pain] : no abdominal pain [Depression] : no depression [de-identified] : numbness in hands

## 2019-05-02 NOTE — ASSESSMENT
[FreeTextEntry1] : Drainage from left side of pannus superior to left groin. Unable to identify a specific cystic mass as the source of the fluid. Will need to obtain US of area to identify primary lesion. Once lesion identified, can consider excision. No evidence of acute infection at this time.

## 2019-05-02 NOTE — ADDENDUM
[FreeTextEntry1] : U/S results reviewed, demonstrate superficial fluid collection associated with prominent follicle. Will plan to excise in OR and send for path and Cx. Will attempt to localize follicle with intra-op US.

## 2019-05-02 NOTE — HISTORY OF PRESENT ILLNESS
[FreeTextEntry1] : 58-year-old man with a 2 month history of left groin lesion. He states that he had a lump in the left groin, was evaluated by his urologist who squeezed it to express out some bloody fluid. The drainage failed to resolve, and he was referred to a dermatologist, Dr. Astudillo, who incised the area and then sutured it. The area continues to drain. He complains of mild discomfort and itching in the area. He denies trauma to the area. He states it started as what appeared to be a cyst. He has had not had previous lesions like this in the past.\par \par PMH: gout, anxiety, hypercholesterolemia, eczema, morbid obesity, back, hip and knee pain from falling down stairs years ago\par PSH: right hip replacement 7/31/2018 (plan for same on left soon)\par All: levaquin - anxiety\par Meds: Losartan, probenecid, finasteride, clonazepam, zolpidem, eczema cream, multivitamin, fiber supplement\par Soc: , quit smoking 25 years ago\par

## 2019-05-06 ENCOUNTER — APPOINTMENT (OUTPATIENT)
Dept: PLASTIC SURGERY | Facility: CLINIC | Age: 59
End: 2019-05-06
Payer: MEDICARE

## 2019-05-06 PROCEDURE — 99212 OFFICE O/P EST SF 10 MIN: CPT

## 2019-05-06 NOTE — PHYSICAL EXAM
[de-identified] : Exam stable from prior. Similar subcuticular tract with similar drainage noted with mild induration. No discrete subcutaneous nodule noted on palpation.

## 2019-05-06 NOTE — HISTORY OF PRESENT ILLNESS
[FreeTextEntry1] : Pt returned for re-exam and review of images. C/o persistent blood-tinged drainage from the same area. U/S demonstrated small subcutaneous collection with an inflamed follicle. Pain similar.

## 2019-05-06 NOTE — ASSESSMENT
[FreeTextEntry1] : Chronic-appearing process. No evidence of acute soft tissue infection. Will plan for excision/debridement in OR. Plan for possibly leaving the area open to reduce risk of infection and/or recurrence was discussed with the patient and his spouse. Will attempt to expedite presurgical testing.

## 2019-05-07 ENCOUNTER — OUTPATIENT (OUTPATIENT)
Dept: OUTPATIENT SERVICES | Facility: HOSPITAL | Age: 59
LOS: 1 days | End: 2019-05-07
Payer: MEDICARE

## 2019-05-07 VITALS
TEMPERATURE: 98 F | HEART RATE: 72 BPM | DIASTOLIC BLOOD PRESSURE: 70 MMHG | OXYGEN SATURATION: 97 % | SYSTOLIC BLOOD PRESSURE: 160 MMHG | RESPIRATION RATE: 18 BRPM | HEIGHT: 68 IN | WEIGHT: 300.05 LBS

## 2019-05-07 DIAGNOSIS — Z98.890 OTHER SPECIFIED POSTPROCEDURAL STATES: Chronic | ICD-10-CM

## 2019-05-07 DIAGNOSIS — I10 ESSENTIAL (PRIMARY) HYPERTENSION: ICD-10-CM

## 2019-05-07 DIAGNOSIS — L98.8 OTHER SPECIFIED DISORDERS OF THE SKIN AND SUBCUTANEOUS TISSUE: Chronic | ICD-10-CM

## 2019-05-07 DIAGNOSIS — R18.0 MALIGNANT ASCITES: ICD-10-CM

## 2019-05-07 DIAGNOSIS — G47.30 SLEEP APNEA, UNSPECIFIED: ICD-10-CM

## 2019-05-07 DIAGNOSIS — Z90.89 ACQUIRED ABSENCE OF OTHER ORGANS: Chronic | ICD-10-CM

## 2019-05-07 DIAGNOSIS — Z96.641 PRESENCE OF RIGHT ARTIFICIAL HIP JOINT: Chronic | ICD-10-CM

## 2019-05-07 DIAGNOSIS — D48.5 NEOPLASM OF UNCERTAIN BEHAVIOR OF SKIN: ICD-10-CM

## 2019-05-07 LAB
ANION GAP SERPL CALC-SCNC: 13 MMO/L — SIGNIFICANT CHANGE UP (ref 7–14)
BUN SERPL-MCNC: 29 MG/DL — HIGH (ref 7–23)
CALCIUM SERPL-MCNC: 10.3 MG/DL — SIGNIFICANT CHANGE UP (ref 8.4–10.5)
CHLORIDE SERPL-SCNC: 105 MMOL/L — SIGNIFICANT CHANGE UP (ref 98–107)
CO2 SERPL-SCNC: 25 MMOL/L — SIGNIFICANT CHANGE UP (ref 22–31)
CREAT SERPL-MCNC: 1.41 MG/DL — HIGH (ref 0.5–1.3)
GLUCOSE SERPL-MCNC: 99 MG/DL — SIGNIFICANT CHANGE UP (ref 70–99)
HCT VFR BLD CALC: 46.4 % — SIGNIFICANT CHANGE UP (ref 39–50)
HGB BLD-MCNC: 14.6 G/DL — SIGNIFICANT CHANGE UP (ref 13–17)
MCHC RBC-ENTMCNC: 27.8 PG — SIGNIFICANT CHANGE UP (ref 27–34)
MCHC RBC-ENTMCNC: 31.5 % — LOW (ref 32–36)
MCV RBC AUTO: 88.4 FL — SIGNIFICANT CHANGE UP (ref 80–100)
NRBC # FLD: 0 K/UL — SIGNIFICANT CHANGE UP (ref 0–0)
PLATELET # BLD AUTO: 278 K/UL — SIGNIFICANT CHANGE UP (ref 150–400)
PMV BLD: 9.2 FL — SIGNIFICANT CHANGE UP (ref 7–13)
POTASSIUM SERPL-MCNC: 4.5 MMOL/L — SIGNIFICANT CHANGE UP (ref 3.5–5.3)
POTASSIUM SERPL-SCNC: 4.5 MMOL/L — SIGNIFICANT CHANGE UP (ref 3.5–5.3)
RBC # BLD: 5.25 M/UL — SIGNIFICANT CHANGE UP (ref 4.2–5.8)
RBC # FLD: 14.6 % — HIGH (ref 10.3–14.5)
SODIUM SERPL-SCNC: 143 MMOL/L — SIGNIFICANT CHANGE UP (ref 135–145)
WBC # BLD: 9.67 K/UL — SIGNIFICANT CHANGE UP (ref 3.8–10.5)
WBC # FLD AUTO: 9.67 K/UL — SIGNIFICANT CHANGE UP (ref 3.8–10.5)

## 2019-05-07 PROCEDURE — 93010 ELECTROCARDIOGRAM REPORT: CPT

## 2019-05-07 RX ORDER — HALOBETASOL PROPIONATE 0.5 MG/G
1 OINTMENT TOPICAL
Qty: 0 | Refills: 0 | COMMUNITY

## 2019-05-07 RX ORDER — CRISABOROLE 20 MG/G
0 OINTMENT TOPICAL
Qty: 0 | Refills: 0 | COMMUNITY

## 2019-05-07 RX ORDER — MELOXICAM 15 MG/1
1 TABLET ORAL
Qty: 0 | Refills: 0 | COMMUNITY

## 2019-05-07 RX ORDER — PSYLLIUM SEED (WITH DEXTROSE)
0 POWDER (GRAM) ORAL
Qty: 0 | Refills: 0 | COMMUNITY

## 2019-05-07 RX ORDER — CLONAZEPAM 1 MG
0 TABLET ORAL
Qty: 0 | Refills: 0 | COMMUNITY

## 2019-05-07 RX ORDER — LOSARTAN POTASSIUM 100 MG/1
0 TABLET, FILM COATED ORAL
Qty: 0 | Refills: 0 | COMMUNITY

## 2019-05-07 NOTE — H&P PST ADULT - NSICDXPASTMEDICALHX_GEN_ALL_CORE_FT
PAST MEDICAL HISTORY:  Back pain lumbar    Benign prostatic hypertrophy     Eczema     Gout last attack more than 5 years ago    Hip osteoarthritis     Hypertension     Neuropathy of hand     Obesity     BRITTNY on CPAP     Shoulder pain, bilateral

## 2019-05-07 NOTE — H&P PST ADULT - NEGATIVE BREAST SYMPTOMS
no nipple discharge L/no breast lump L/no breast tenderness L/no breast tenderness R/no nipple discharge R/no breast lump R

## 2019-05-07 NOTE — H&P PST ADULT - RS GEN PE MLT RESP DETAILS PC
respirations non-labored/clear to auscultation bilaterally/breath sounds equal/no rhonchi/no wheezes/no rales

## 2019-05-07 NOTE — H&P PST ADULT - HISTORY OF PRESENT ILLNESS
58 year old male with h/o HTN, BRITTNY, obesity, BPH, anxiety disorders, gout and arthritis presents for preop evaluation with c/o left groin cyst x 1 month.  Pt was evaluated s/p sonogram.  Pt is now scheduled for Excision of Left Groin Cyst on 05/09/19.

## 2019-05-07 NOTE — H&P PST ADULT - NSICDXPASTSURGICALHX_GEN_ALL_CORE_FT
PAST SURGICAL HISTORY:  Fistula repair    H/O colonoscopy 2-3 years ago    History of total right hip replacement july 2018    S/P tonsillectomy

## 2019-05-07 NOTE — H&P PST ADULT - NSICDXPROBLEM_GEN_ALL_CORE_FT
PROBLEM DIAGNOSES  Problem: Neoplasm of uncertain behavior of skin of groin  Assessment and Plan: Scheduled for Excision of Left Groin Cyst on 05/09/19.  Lab results pending.  Preop, famotidine and chlorhexidine instructions provided and questions addressed.    Problem: HTN (hypertension)  Assessment and Plan: Pt instructed to take med on the morning of procedure.    Problem: Sleep apnea  Assessment and Plan: BRITTNY precautions, pt uses cpap machine.

## 2019-05-07 NOTE — H&P PST ADULT - GASTROINTESTINAL DETAILS
nontender/no distention/no masses palpable/no guarding/no rigidity/no organomegaly/soft/bowel sounds normal/no bruit/no rebound tenderness

## 2019-05-08 ENCOUNTER — TRANSCRIPTION ENCOUNTER (OUTPATIENT)
Age: 59
End: 2019-05-08

## 2019-05-09 ENCOUNTER — RESULT REVIEW (OUTPATIENT)
Age: 59
End: 2019-05-09

## 2019-05-09 ENCOUNTER — OUTPATIENT (OUTPATIENT)
Dept: OUTPATIENT SERVICES | Facility: HOSPITAL | Age: 59
LOS: 1 days | Discharge: HOME | End: 2019-05-09
Payer: MEDICARE

## 2019-05-09 VITALS
DIASTOLIC BLOOD PRESSURE: 68 MMHG | HEART RATE: 68 BPM | RESPIRATION RATE: 15 BRPM | TEMPERATURE: 98 F | SYSTOLIC BLOOD PRESSURE: 141 MMHG | OXYGEN SATURATION: 95 %

## 2019-05-09 VITALS
TEMPERATURE: 98 F | DIASTOLIC BLOOD PRESSURE: 81 MMHG | SYSTOLIC BLOOD PRESSURE: 176 MMHG | RESPIRATION RATE: 18 BRPM | WEIGHT: 300.05 LBS | HEART RATE: 76 BPM | OXYGEN SATURATION: 97 % | HEIGHT: 68 IN

## 2019-05-09 DIAGNOSIS — Z98.890 OTHER SPECIFIED POSTPROCEDURAL STATES: Chronic | ICD-10-CM

## 2019-05-09 DIAGNOSIS — R18.0 MALIGNANT ASCITES: ICD-10-CM

## 2019-05-09 DIAGNOSIS — Z96.641 PRESENCE OF RIGHT ARTIFICIAL HIP JOINT: Chronic | ICD-10-CM

## 2019-05-09 DIAGNOSIS — L98.8 OTHER SPECIFIED DISORDERS OF THE SKIN AND SUBCUTANEOUS TISSUE: Chronic | ICD-10-CM

## 2019-05-09 DIAGNOSIS — Z90.89 ACQUIRED ABSENCE OF OTHER ORGANS: Chronic | ICD-10-CM

## 2019-05-09 LAB
GRAM STN WND: SIGNIFICANT CHANGE UP
SPECIMEN SOURCE: SIGNIFICANT CHANGE UP

## 2019-05-09 PROCEDURE — 13102 CMPLX RPR TRUNK ADDL 5CM/<: CPT

## 2019-05-09 PROCEDURE — 11406 EXC TR-EXT B9+MARG >4.0 CM: CPT

## 2019-05-09 PROCEDURE — 13101 CMPLX RPR TRUNK 2.6-7.5 CM: CPT

## 2019-05-09 PROCEDURE — 88304 TISSUE EXAM BY PATHOLOGIST: CPT | Mod: 26

## 2019-05-09 RX ORDER — CEPHALEXIN 500 MG
1 CAPSULE ORAL
Qty: 6 | Refills: 0
Start: 2019-05-09 | End: 2019-05-11

## 2019-05-09 NOTE — BRIEF OPERATIVE NOTE - NSICDXBRIEFPROCEDURE_GEN_ALL_CORE_FT
PROCEDURES:  Excision of benign skin lesion (excluding skin tags) of abdominal wall, excised area over 4.0cm in diameter, including margins 09-May-2019 12:15:17  Janett Martinez

## 2019-05-09 NOTE — BRIEF OPERATIVE NOTE - OPERATION/FINDINGS
Excision of skin and sinus tract from left lower abdomen.  Specimen and cultures sent.  Irrigation with 3L NS. Closure in 3 layers

## 2019-05-09 NOTE — ASU DISCHARGE PLAN (ADULT/PEDIATRIC) - CALL YOUR DOCTOR IF YOU HAVE ANY OF THE FOLLOWING:
Wound/Surgical Site with redness, or foul smelling discharge or pus/Bleeding that does not stop/Pain not relieved by Medications

## 2019-05-09 NOTE — ASU DISCHARGE PLAN (ADULT/PEDIATRIC) - CARE PROVIDER_API CALL
Riley Penny)  Surgery  [facility]  [address], [address]  [place] [id number]  Phone: [phone]  Fax: [phone]  Follow Up Time:

## 2019-05-09 NOTE — ASU DISCHARGE PLAN (ADULT/PEDIATRIC) - ASU DC SPECIAL INSTRUCTIONSFT
Please make an appointment to follow up with Dr Penny next week.  You may call [phone] to schedule this appointment.    You may take the dressing off in two days and replace it with dry gauze and tape.  Take antibiotics as prescribed. Take tylenol or motrin, as needed, for pain

## 2019-05-12 LAB — CULTURE - SURGICAL SITE: SIGNIFICANT CHANGE UP

## 2019-05-13 ENCOUNTER — APPOINTMENT (OUTPATIENT)
Dept: PLASTIC SURGERY | Facility: CLINIC | Age: 59
End: 2019-05-13
Payer: MEDICARE

## 2019-05-13 PROCEDURE — 99024 POSTOP FOLLOW-UP VISIT: CPT

## 2019-05-13 NOTE — PHYSICAL EXAM
[de-identified] : Incision intact. Sutures in place. No surrounding erythema. Minimal ecchymoses superomedially. No induration.

## 2019-05-13 NOTE — ASSESSMENT
[FreeTextEntry1] : No evidence of infection or recurrent collection.\par \par Return to office in one week for suture removal. Continue routine wound care for now.

## 2019-05-13 NOTE — HISTORY OF PRESENT ILLNESS
[FreeTextEntry1] : The patient denies significant discomfort. He has had minimal drainage the wound.\par \par Culture demonstrated mixed sheng.

## 2019-05-20 ENCOUNTER — APPOINTMENT (OUTPATIENT)
Dept: PLASTIC SURGERY | Facility: CLINIC | Age: 59
End: 2019-05-20
Payer: MEDICARE

## 2019-05-20 PROCEDURE — 99212 OFFICE O/P EST SF 10 MIN: CPT

## 2019-05-20 NOTE — PHYSICAL EXAM
[de-identified] : well-appearing [de-identified] : left groin with instact incision. suture sites with irritation. Sutures removed. Incision nontender. Minimal erythema.

## 2019-05-20 NOTE — ASSESSMENT
[FreeTextEntry1] : Healing well. Irritation at suture sites but no evidence of infection or recurrence. Wash area once-to-twice daily with soap and water, place dry dressing. RTO Friday to ensure improvement of areas of suture irriation.

## 2019-05-20 NOTE — HISTORY OF PRESENT ILLNESS
[FreeTextEntry1] : c/o mild discomfort in area. has noted irritation from tape use in the area. cx showed skin sheng, path benign.

## 2019-05-24 ENCOUNTER — APPOINTMENT (OUTPATIENT)
Dept: PLASTIC SURGERY | Facility: CLINIC | Age: 59
End: 2019-05-24
Payer: MEDICARE

## 2019-05-24 PROCEDURE — 99212 OFFICE O/P EST SF 10 MIN: CPT

## 2019-05-24 NOTE — REASON FOR VISIT
[Post Op: _________] : a [unfilled] post op visit [FreeTextEntry1] : DOS 05/09/19 s/p excision of left groin cyst.

## 2019-05-24 NOTE — PHYSICAL EXAM
[de-identified] : erythema improved. still a small area of erythema with fibrinous exudate at a central superior suture site This area appears to have epithelialized. The area was prepped with alcohol, unroofed, and the fibrinous exudate curetted out. the area was then ablated with silver nitrate and a dressing placed.

## 2019-05-24 NOTE — ASSESSMENT
[FreeTextEntry1] : healing well. Small area of prematurely epithelialize suture tract. Continue daily wound care with shower with soap and watter. scrub area gently in shower to maintain cleanliness. Apply bandage to central area of incision. RTO 1 week.

## 2019-05-29 ENCOUNTER — APPOINTMENT (OUTPATIENT)
Dept: PLASTIC SURGERY | Facility: CLINIC | Age: 59
End: 2019-05-29
Payer: MEDICARE

## 2019-05-29 PROCEDURE — 99212 OFFICE O/P EST SF 10 MIN: CPT

## 2019-05-29 NOTE — HISTORY OF PRESENT ILLNESS
[FreeTextEntry1] : continues to note some drainage from superior suture site. denies pain. doing dressing changes once a day

## 2019-05-29 NOTE — ASSESSMENT
[FreeTextEntry1] : Small open area at excision site. No evidence of infection. Cleanse area daily. and perform at least BID dressing changes with absorbant gauze. May use alginate dressing instead. f/u 1.5 weeks.

## 2019-05-29 NOTE — PHYSICAL EXAM
[de-identified] : incision well healed except for superior central punctate defect with exposed fat at base, no residual suture material encountered on probing after prepping area. minimal tenderness. granulation again addressed with silver nitrate.

## 2019-06-10 ENCOUNTER — APPOINTMENT (OUTPATIENT)
Dept: PLASTIC SURGERY | Facility: CLINIC | Age: 59
End: 2019-06-10
Payer: MEDICARE

## 2019-06-10 PROCEDURE — 99212 OFFICE O/P EST SF 10 MIN: CPT

## 2019-06-14 NOTE — ASSESSMENT
[FreeTextEntry1] : Vicryl suture removed from wound, which was debrided to allow for healing by secondary intention and avoiding premature re-epithelialization and cyst re-formation. Continue wound care; wash area daily with soap and water, apply alginate dressing. RTO 1-2 weeks.

## 2019-06-14 NOTE — REASON FOR VISIT
[FreeTextEntry1] : continues to note some drainage from superior suture site. denies pain. doing dressing changes once a day \par

## 2019-06-14 NOTE — PHYSICAL EXAM
[de-identified] : well-appearing [de-identified] : left groin incision with elliptical defect, with some undermining inferiorly. Careful inspection demonstrates residual vicryl suture in subcutaneous tissue- removed. Undermined area debrided.

## 2019-06-17 ENCOUNTER — APPOINTMENT (OUTPATIENT)
Dept: PLASTIC SURGERY | Facility: CLINIC | Age: 59
End: 2019-06-17
Payer: MEDICARE

## 2019-06-17 PROCEDURE — 99211 OFF/OP EST MAY X REQ PHY/QHP: CPT

## 2019-06-17 NOTE — PHYSICAL EXAM
[de-identified] : left groin wound decreased in size; no significant surrounding erythema, nontender. gentle exploration demonstrates no detectable suture remnant at base. mildly robust granulation present. No undermining. silver nitrate lightly applied.

## 2019-06-17 NOTE — ASSESSMENT
[FreeTextEntry1] : Appears to be healing well. Continue current wound care. F/u in 2 weeks; call sooner if questions/issues.

## 2019-06-27 NOTE — DISCHARGE NOTE ADULT - FUNCTIONAL SCREEN CURRENT LEVEL: DRESSING, MLM
Chief Complaint:   Patient presents with:  Medication Follow-Up    HPI:   This is a 47year old male presenting for anxiety follow up. Reportedly sleeping only 4 hours/night d/t worsening anxiety symptoms.  Can fall asleep, but often wakes in the middle of for cough, chest tightness, shortness of breath and wheezing. Cardiovascular: Negative for chest pain, palpitations and leg swelling. Gastrointestinal: Negative for heartburn, nausea, vomiting, abdominal pain, diarrhea and blood in stool.    Musculoske Neurological: He is alert and oriented to person, place, and time. No sensory deficit. Skin: Skin is warm and dry. No lesion and no rash noted. He is not diaphoretic. No erythema. No pallor.    Psychiatric: His behavior is normal. Judgment and thought c (2) assistive person

## 2019-07-01 ENCOUNTER — APPOINTMENT (OUTPATIENT)
Dept: PLASTIC SURGERY | Facility: CLINIC | Age: 59
End: 2019-07-01
Payer: MEDICARE

## 2019-07-01 DIAGNOSIS — R18.8 OTHER ASCITES: ICD-10-CM

## 2019-07-01 PROCEDURE — 99211 OFF/OP EST MAY X REQ PHY/QHP: CPT

## 2019-07-03 ENCOUNTER — APPOINTMENT (OUTPATIENT)
Dept: ORTHOPEDIC SURGERY | Facility: CLINIC | Age: 59
End: 2019-07-03
Payer: MEDICARE

## 2019-07-03 DIAGNOSIS — M16.12 UNILATERAL PRIMARY OSTEOARTHRITIS, LEFT HIP: ICD-10-CM

## 2019-07-03 PROCEDURE — 99213 OFFICE O/P EST LOW 20 MIN: CPT

## 2019-07-03 NOTE — PHYSICAL EXAM
[FreeTextEntry2] : Hip is doing extremely well.  He easily flexes past 115 degrees of full extension can abduct 75 degrees and abducts to20.  He is having much more problem now with his left hip.  He flexes to only 75 degrees he has 30 degrees of abduction and cannot even abduct in neutral.  And he has no real rotation but is stuck in some external rotation of about 30 degrees.  He would like to now do his left total knee replacement.  \par \par

## 2019-07-03 NOTE — HISTORY OF PRESENT ILLNESS
[de-identified] : Pt is a 56 y/o male s/p R THR 7/30/18. He received a cortisone injection of the right hip greater trochanteric bursa on 3/6/19 which didn't help. He is here today to discuss scheduling for a Left THR, confirmed with x-rays showing bone-on-bone arthritis in the left hip on 3/6/19, ideally wanting this procedure to be done on 8/5/19. Patient has no changes to his hips. \par \par Patient says Aspirin exacerbates his gout attacks.

## 2019-07-03 NOTE — DISCUSSION/SUMMARY
[de-identified] : He is doing well with his right hip.  He would like now to schedule a left total hip replacement he is aware of the risk and benefits and he is aware that because of his weight of over 300pounds that that increases complications.  Surgical risk    A  long discussion was had with the patient as what the total joint replacement would entail.  A model was used to demonstrate the operation.  The hospitalization and rehabilitation were discussed.  He has a perioperative antibiotics and DVT prophylaxis were discussed.  They use Xarelto on this occasion since he cannot take aspirin.  The risks, benefits and alternatives to surgical intervention were discussed at length with the patient. Specific risks discussed included: infection, wound breakdown, numbness and damage to nerves, tendon, muscle, arteries or other blood vessels.  The possibility of recurrent pain, no improvement in pain and actual worsening of the pain were also mentioned in conversation with the patient. Medical complications related to the patient's general medical health including deep vein thrombosis, pulmonary embolus, heart attack, stroke, death and other complications from anesthesia were discussed as well. The patient was told that we will take steps to minimize these risks by using sterile technique, antibiotics and DVT prophylaxis when appropriate and following the patient postoperatively in the clinic setting to monitor progress. The benefits of surgery were discussed with the patient including the potential to improve the current clinical condition throughout operative intervention. Alternatives to surgical intervention include continued conservative management which may yield less than optimal results this particular patient. All questions were answered to the satisfaction of the patient.

## 2019-07-04 PROBLEM — R18.8 GROIN FLUID COLLECTION: Status: RESOLVED | Noted: 2019-04-29 | Resolved: 2019-07-04

## 2019-07-04 NOTE — ASSESSMENT
[FreeTextEntry1] : Wound healed, collection resolved. Local edema likely related to extensive pannus. Recommended weight loss and exercise, which should be facilitated by hip replacement.\par \par Follow-up as needed.

## 2019-07-04 NOTE — HISTORY OF PRESENT ILLNESS
[FreeTextEntry1] : Patient states area has not drained in past week. Feeling well, but notices some swelling in the area.

## 2019-07-04 NOTE — PHYSICAL EXAM
[de-identified] : left groin wound well-healed. No evidence of cyst recurrence. No erythema or tenderness. Mild edema.

## 2019-08-05 ENCOUNTER — OUTPATIENT (OUTPATIENT)
Dept: OUTPATIENT SERVICES | Facility: HOSPITAL | Age: 59
LOS: 1 days | End: 2019-08-05
Payer: MEDICARE

## 2019-08-05 VITALS
WEIGHT: 302.03 LBS | RESPIRATION RATE: 14 BRPM | OXYGEN SATURATION: 96 % | SYSTOLIC BLOOD PRESSURE: 177 MMHG | DIASTOLIC BLOOD PRESSURE: 83 MMHG | HEIGHT: 69 IN | TEMPERATURE: 98 F | HEART RATE: 71 BPM

## 2019-08-05 DIAGNOSIS — L98.8 OTHER SPECIFIED DISORDERS OF THE SKIN AND SUBCUTANEOUS TISSUE: Chronic | ICD-10-CM

## 2019-08-05 DIAGNOSIS — G47.33 OBSTRUCTIVE SLEEP APNEA (ADULT) (PEDIATRIC): ICD-10-CM

## 2019-08-05 DIAGNOSIS — Z98.890 OTHER SPECIFIED POSTPROCEDURAL STATES: Chronic | ICD-10-CM

## 2019-08-05 DIAGNOSIS — M16.12 UNILATERAL PRIMARY OSTEOARTHRITIS, LEFT HIP: ICD-10-CM

## 2019-08-05 DIAGNOSIS — Z01.818 ENCOUNTER FOR OTHER PREPROCEDURAL EXAMINATION: ICD-10-CM

## 2019-08-05 DIAGNOSIS — Z90.89 ACQUIRED ABSENCE OF OTHER ORGANS: Chronic | ICD-10-CM

## 2019-08-05 DIAGNOSIS — I10 ESSENTIAL (PRIMARY) HYPERTENSION: ICD-10-CM

## 2019-08-05 DIAGNOSIS — Z96.641 PRESENCE OF RIGHT ARTIFICIAL HIP JOINT: Chronic | ICD-10-CM

## 2019-08-05 DIAGNOSIS — Z29.9 ENCOUNTER FOR PROPHYLACTIC MEASURES, UNSPECIFIED: ICD-10-CM

## 2019-08-05 LAB
ANION GAP SERPL CALC-SCNC: 14 MMOL/L — SIGNIFICANT CHANGE UP (ref 5–17)
BLD GP AB SCN SERPL QL: NEGATIVE — SIGNIFICANT CHANGE UP
BUN SERPL-MCNC: 20 MG/DL — SIGNIFICANT CHANGE UP (ref 7–23)
CALCIUM SERPL-MCNC: 9.8 MG/DL — SIGNIFICANT CHANGE UP (ref 8.4–10.5)
CHLORIDE SERPL-SCNC: 100 MMOL/L — SIGNIFICANT CHANGE UP (ref 96–108)
CO2 SERPL-SCNC: 24 MMOL/L — SIGNIFICANT CHANGE UP (ref 22–31)
CREAT SERPL-MCNC: 1.41 MG/DL — HIGH (ref 0.5–1.3)
GLUCOSE SERPL-MCNC: 82 MG/DL — SIGNIFICANT CHANGE UP (ref 70–99)
HBA1C BLD-MCNC: 5.4 % — SIGNIFICANT CHANGE UP (ref 4–5.6)
HCT VFR BLD CALC: 43 % — SIGNIFICANT CHANGE UP (ref 39–50)
HGB BLD-MCNC: 13.7 G/DL — SIGNIFICANT CHANGE UP (ref 13–17)
MCHC RBC-ENTMCNC: 28.5 PG — SIGNIFICANT CHANGE UP (ref 27–34)
MCHC RBC-ENTMCNC: 31.9 GM/DL — LOW (ref 32–36)
MCV RBC AUTO: 89.6 FL — SIGNIFICANT CHANGE UP (ref 80–100)
MRSA PCR RESULT.: SIGNIFICANT CHANGE UP
PLATELET # BLD AUTO: 256 K/UL — SIGNIFICANT CHANGE UP (ref 150–400)
POTASSIUM SERPL-MCNC: 4.2 MMOL/L — SIGNIFICANT CHANGE UP (ref 3.5–5.3)
POTASSIUM SERPL-SCNC: 4.2 MMOL/L — SIGNIFICANT CHANGE UP (ref 3.5–5.3)
RBC # BLD: 4.8 M/UL — SIGNIFICANT CHANGE UP (ref 4.2–5.8)
RBC # FLD: 14.1 % — SIGNIFICANT CHANGE UP (ref 10.3–14.5)
RH IG SCN BLD-IMP: POSITIVE — SIGNIFICANT CHANGE UP
S AUREUS DNA NOSE QL NAA+PROBE: DETECTED
SODIUM SERPL-SCNC: 138 MMOL/L — SIGNIFICANT CHANGE UP (ref 135–145)
WBC # BLD: 8.49 K/UL — SIGNIFICANT CHANGE UP (ref 3.8–10.5)
WBC # FLD AUTO: 8.49 K/UL — SIGNIFICANT CHANGE UP (ref 3.8–10.5)

## 2019-08-05 PROCEDURE — 87640 STAPH A DNA AMP PROBE: CPT

## 2019-08-05 PROCEDURE — 86900 BLOOD TYPING SEROLOGIC ABO: CPT

## 2019-08-05 PROCEDURE — 86901 BLOOD TYPING SEROLOGIC RH(D): CPT

## 2019-08-05 PROCEDURE — 83036 HEMOGLOBIN GLYCOSYLATED A1C: CPT

## 2019-08-05 PROCEDURE — 80048 BASIC METABOLIC PNL TOTAL CA: CPT

## 2019-08-05 PROCEDURE — 85027 COMPLETE CBC AUTOMATED: CPT

## 2019-08-05 PROCEDURE — 86850 RBC ANTIBODY SCREEN: CPT

## 2019-08-05 PROCEDURE — G0463: CPT

## 2019-08-05 RX ORDER — ZOLPIDEM TARTRATE 10 MG/1
1 TABLET ORAL
Qty: 0 | Refills: 0 | DISCHARGE

## 2019-08-05 NOTE — H&P PST ADULT - ATTENDING PHYSICIAN: I HAVE REVIEWED THE CLINICAL DOCUMENTATION AND AGREE WITH THE ABOVE NOTE
Patient : Lisette Arredondo Age: 49 year old Sex: female   MRN: [id number] Encounter Date: 6/30/2017      History     Chief Complaint   Patient presents with   • Chest Pain (Adult)     Patient presents with chest pain. Patient states that she has had chest pain intermittently for the last month. She did discuss episodes of tachycardia showing a doctor. They did place a Holter monitor. She has not followed up with results. Episodes are not tied to any specific activity. No diaphoresis. She does state that 4 hours prior to arrival tonight she did have some burning in the middle of her chest. Nothing seen make it better or worse. She states is better here at rest in the ER.            No Known Allergies    Prior to Admission Medications    FLUTICASONE (FLONASE ALLERGY RELIEF) 50 MCG/ACT NASAL SPRAY    Spray in each nostril daily.       New Prescriptions    No medications on file       Past Medical History:   Diagnosis Date   • Hypertension        Past Surgical History:   Procedure Laterality Date   • BELPHAROPTOSIS REPAIR     • D AND C     • LASIK SURGERY     • TONSILLECTOMY AND ADENOIDECTOMY         Family History   Problem Relation Age of Onset   • Hypertension Mother    • Diabetes Mother    • Lipids Mother    • OTHER Father      parkinson's disease   • Heart Brother 49     MI        Social History   Substance Use Topics   • Smoking status: Never Smoker   • Smokeless tobacco: Never Used   • Alcohol use 1.2 oz/week     2 Standard drinks or equivalent per week       Review of Systems   Constitutional: Negative.    HENT: Negative.    Eyes: Negative.    Respiratory: Negative.    Cardiovascular: Positive for chest pain.   Gastrointestinal: Negative.    Endocrine: Negative.    Genitourinary: Negative.    Musculoskeletal: Negative.    Skin: Negative.    Allergic/Immunologic: Negative.    Neurological: Negative.    Hematological: Negative.    Psychiatric/Behavioral: Negative.    All other systems reviewed and are  negative.      Physical Exam     ED Triage Vitals   ED Triage Vitals Group      Temp 06/30/17 2159 98.2 °F (36.8 °C)      Pulse 06/30/17 2157 90      Resp 06/30/17 2157 12      BP 06/30/17 2157 180/81      SpO2 06/30/17 2159 99 %      EtCO2 mmHg --       Height 06/30/17 2159 5' 8\" (1.727 m)      Weight 06/30/17 2159 177 lb (80.3 kg)      Weight Scale Used 06/30/17 2159 ED Stated       Physical Exam   Constitutional: She is oriented to person, place, and time. She appears well-developed and well-nourished.   HENT:   Head: Normocephalic and atraumatic.   Right Ear: External ear normal.   Left Ear: External ear normal.   Nose: Nose normal.   Mouth/Throat: Oropharynx is clear and moist.   Eyes: Conjunctivae and EOM are normal. Pupils are equal, round, and reactive to light.   Neck: Normal range of motion. Neck supple.   Cardiovascular: Normal rate, regular rhythm, normal heart sounds and intact distal pulses.    Pulmonary/Chest: Effort normal and breath sounds normal.   Abdominal: Soft. Bowel sounds are normal.   Musculoskeletal: Normal range of motion.   Neurological: She is alert and oriented to person, place, and time. She has normal reflexes.   Skin: Skin is warm and dry.   Psychiatric: She has a normal mood and affect. Her behavior is normal. Judgment and thought content normal.   Nursing note and vitals reviewed.      ED Course     Procedures    Lab Results     Results for orders placed or performed during the hospital encounter of 06/30/17   CBC & Auto Differential   Result Value Ref Range    WBC 10.8 4.2 - 11.0 K/mcL    RBC 4.58 4.00 - 5.20 mil/mcL    HGB 13.0 12.0 - 15.5 g/dL    HCT 37.3 36.0 - 46.5 %    MCV 81.4 78.0 - 100.0 fl    MCH 28.4 26.0 - 34.0 pg    MCHC 34.9 32.0 - 36.5 g/dL    RDW-CV 13.5 11.0 - 15.0 %     140 - 450 K/mcL    DIFF TYPE AUTOMATED DIFFERENTIAL     Neutrophil 64 %    LYMPH 26 %    MONO 9 %    EOSIN 1 %    BASO 0 %    Absolute Neutrophil 6.9 1.8 - 7.7 K/mcL    Absolute Lymph 2.8  1.0 - 4.8 K/mcL    Absolute Mono 1.0 (H) 0.3 - 0.9 K/mcL    Absolute Eos 0.1 0.1 - 0.5 K/mcL    Absolute Baso 0.0 0.0 - 0.3 K/mcL   Basic Metabolic Panel   Result Value Ref Range    Sodium 142 135 - 145 mmol/L    Potassium 3.1 (L) 3.4 - 5.1 mmol/L    Chloride 105 98 - 107 mmol/L    Carbon Dioxide 26 21 - 32 mmol/L    Anion Gap 14 10 - 20 mmol/L    Glucose 95 65 - 99 mg/dL    BUN 14 6 - 20 mg/dL    Creatinine 0.73 0.51 - 0.95 mg/dL    GFR Estimate,  >90     GFR Estimate, Non African American >90     BUN/Creatinine Ratio 19 7 - 25    CALCIUM 9.5 8.4 - 10.2 mg/dL   Partial Thromboplastin Time   Result Value Ref Range    PTT 28 22 - 30 sec   Troponin I Ultra Sensitive   Result Value Ref Range    TROPONIN I <0.02 <0.05 ng/mL   Prothrombin Time   Result Value Ref Range    PROTIME 10.7 9.7 - 11.8 sec    INR 1.0        EKG Results     EKG Interpretation  Rate: 90  Rhythm: normal sinus rhythm   Abnormality: no    EKG interpreted by ED physician    Radiology Results     Imaging Results          XR CHEST AP OR PA - PORTABLE (In process)                 ED Medication Orders     Start Ordered     Status Ordering Provider    06/30/17 2254 06/30/17 2253  potassium chloride (K-DUR,KLOR-CON) CR tablet 40 mEq  ONCE      Ordered VELMA TIWARI    06/30/17 2216 06/30/17 2215  aspirin chewable 324 mg  ONCE      Last MAR action:  Given VELMA TIWARI    06/30/17 2216 06/30/17 2215  aluminum-magnesium hydroxide-simethicone (MAALOX) 200-200-20 MG/5ML suspension 30 mL  (GI Cocktail)  ONCE      Last MAR action:  Given VELMA TIWARI    06/30/17 2216 06/30/17 2215  lidocaine viscous (XYLOCAINE) 2 % oral solution 15 mL  (GI Cocktail)  ONCE      Last MAR action:  Given VELMA TIWARI          [facility]  Number of Diagnoses or Management Options  Chest pain, unspecified type:   Diagnosis management comments: We did obtain EKG on arrival. Patient was evaluated upon being roomed in ER. Labs reviewed. She was noted to be low on her  potassium. Her potassium was 3.1. We will orally replace this. I did give her aspirin and GI cocktail in the ER. Her discomfort is been unchanged. She defers further pain medication in the ER. \ x-ray showed no acute process. I did discuss options regarding her evaluation with her. I did state option 1 she was placed in observation in the ER have possible stress test in the hospital. She defers this option. I did state option to be do obtain repeat troponin EKG at 2 hours. She is agreeable this plan. At 11:06 PM. I did sign out her care to Dr. Patel. He is agreeable to follow-up with labs and EKG. Patient informed agrees and understands plan at this time. No further concerns.      Chest pain, unspecified type  (primary encounter diagnosis)      Clinical Impression     No diagnosis found.    Disposition        There is no disposition   There is no comment                  Vicente Guillory,   07/01/17 [id number]     Statement Selected

## 2019-08-05 NOTE — H&P PST ADULT - NSICDXPROBLEM_GEN_ALL_CORE_FT
PROBLEM DIAGNOSES  Problem: Unilateral primary osteoarthritis, left hip  Assessment and Plan: Scheduled for Left total hip replacement.  Preop instructions given.  Labs pending. Nasal swab for MRSA/MSSA  FS upon arrival to [facility]  Pre-emptive analgesia  Chlorhexidine to affected site preop  Medical evaluation done, pending results of labs    Problem: Prophylactic measure  Assessment and Plan: The Caprini score indicates that this patient is at high risk for a VTE event (score 6 or greater). Surgical patients in this group will benefit from both pharmacologic prophylaxis and intermittent compression devices.  The surgical team will determine the balance between VTE risk and bleeding risk, and other clinical considerations      Problem: BRITTNY on CPAP  Assessment and Plan: BRITTNY precautions, OR notified.  Instructed to bring CPAP machine    Problem: Hypertension  Assessment and Plan: Instructed to continue HTN meds and to take DOS with small sips of water.

## 2019-08-05 NOTE — H&P PST ADULT - HISTORY OF PRESENT ILLNESS
Mr. Layton is a 58 year old morbidly obese man with PMH HTN, BRITTNY uses CPAP, BPH, Anxiety, Insomnia and OA s/p right THR now presents for left THR.

## 2019-08-05 NOTE — H&P PST ADULT - ASSESSMENT
CAPRINI SCORE [CLOT]    AGE RELATED RISK FACTORS                                                       MOBILITY RELATED FACTORS  [x ] Age 41-60 years                                            (1 Point)                  [ ] Bed rest                                                        (1 Point)  [ ] Age: 61-74 years                                           (2 Points)                 [ ] Plaster cast                                                   (2 Points)  [ ] Age= 75 years                                              (3 Points)                 [ ] Bed bound for more than 72 hours                 (2 Points)    DISEASE RELATED RISK FACTORS                                               GENDER SPECIFIC FACTORS  [ ] Edema in the lower extremities                       (1 Point)                  [ ] Pregnancy                                                     (1 Point)  [ ] Varicose veins                                               (1 Point)                  [ ] Post-partum < 6 weeks                                   (1 Point)             [x ] BMI > 25 Kg/m2                                            (1 Point)                  [ ] Hormonal therapy  or oral contraception          (1 Point)                 [ ] Sepsis (in the previous month)                        (1 Point)                  [ ] History of pregnancy complications                 (1 point)  [ ] Pneumonia or serious lung disease                                               [ ] Unexplained or recurrent                     (1 Point)           (in the previous month)                               (1 Point)  [ ] Abnormal pulmonary function test                     (1 Point)                 SURGERY RELATED RISK FACTORS  [ ] Acute myocardial infarction                              (1 Point)                 [ ]  Section                                             (1 Point)  [ ] Congestive heart failure (in the previous month)  (1 Point)               [ ] Minor surgery                                                  (1 Point)   [ ] Inflammatory bowel disease                             (1 Point)                 [ ] Arthroscopic surgery                                        (2 Points)  [ ] Central venous access                                      (2 Points)                [ ] General surgery lasting more than 45 minutes   (2 Points)       [ ] Stroke (in the previous month)                          (5 Points)               [x ] Elective arthroplasty                                         (5 Points)                                                                                                                                               HEMATOLOGY RELATED FACTORS                                                 TRAUMA RELATED RISK FACTORS  [ ] Prior episodes of VTE                                     (3 Points)                 [ ] Fracture of the hip, pelvis, or leg                       (5 Points)  [ ] Positive family history for VTE                         (3 Points)                 [ ] Acute spinal cord injury (in the previous month)  (5 Points)  [ ] Prothrombin 95735 A                                     (3 Points)                 [ ] Paralysis  (less than 1 month)                             (5 Points)  [ ] Factor V Leiden                                             (3 Points)                  [ ] Multiple Trauma within 1 month                        (5 Points)  [ ] Lupus anticoagulants                                     (3 Points)                                                           [ ] Anticardiolipin antibodies                               (3 Points)                                                       [ ] High homocysteine in the blood                      (3 Points)                                             [ ] Other congenital or acquired thrombophilia      (3 Points)                                                [ ] Heparin induced thrombocytopenia                  (3 Points)                                          Total Score [   7       ]

## 2019-08-15 ENCOUNTER — TRANSCRIPTION ENCOUNTER (OUTPATIENT)
Age: 59
End: 2019-08-15

## 2019-08-16 ENCOUNTER — APPOINTMENT (OUTPATIENT)
Dept: ORTHOPEDIC SURGERY | Facility: HOSPITAL | Age: 59
End: 2019-08-16

## 2019-08-16 ENCOUNTER — INPATIENT (INPATIENT)
Facility: HOSPITAL | Age: 59
LOS: 2 days | Discharge: HOME | DRG: 470 | End: 2019-08-19
Attending: ORTHOPAEDIC SURGERY | Admitting: ORTHOPAEDIC SURGERY
Payer: MEDICARE

## 2019-08-16 ENCOUNTER — RESULT REVIEW (OUTPATIENT)
Age: 59
End: 2019-08-16

## 2019-08-16 VITALS
RESPIRATION RATE: 16 BRPM | SYSTOLIC BLOOD PRESSURE: 179 MMHG | OXYGEN SATURATION: 96 % | HEART RATE: 83 BPM | HEIGHT: 69 IN | TEMPERATURE: 98 F | WEIGHT: 302.03 LBS | DIASTOLIC BLOOD PRESSURE: 97 MMHG

## 2019-08-16 DIAGNOSIS — Z96.641 PRESENCE OF RIGHT ARTIFICIAL HIP JOINT: Chronic | ICD-10-CM

## 2019-08-16 DIAGNOSIS — Z90.89 ACQUIRED ABSENCE OF OTHER ORGANS: Chronic | ICD-10-CM

## 2019-08-16 DIAGNOSIS — L98.8 OTHER SPECIFIED DISORDERS OF THE SKIN AND SUBCUTANEOUS TISSUE: Chronic | ICD-10-CM

## 2019-08-16 DIAGNOSIS — M16.12 UNILATERAL PRIMARY OSTEOARTHRITIS, LEFT HIP: ICD-10-CM

## 2019-08-16 DIAGNOSIS — Z98.890 OTHER SPECIFIED POSTPROCEDURAL STATES: Chronic | ICD-10-CM

## 2019-08-16 LAB — GLUCOSE BLDC GLUCOMTR-MCNC: 89 MG/DL — SIGNIFICANT CHANGE UP (ref 70–99)

## 2019-08-16 PROCEDURE — 72170 X-RAY EXAM OF PELVIS: CPT | Mod: 26,59

## 2019-08-16 PROCEDURE — 73501 X-RAY EXAM HIP UNI 1 VIEW: CPT | Mod: 26,LT

## 2019-08-16 PROCEDURE — 88311 DECALCIFY TISSUE: CPT | Mod: 26

## 2019-08-16 PROCEDURE — 88305 TISSUE EXAM BY PATHOLOGIST: CPT | Mod: 26

## 2019-08-16 PROCEDURE — 27130 TOTAL HIP ARTHROPLASTY: CPT | Mod: LT

## 2019-08-16 RX ORDER — CELECOXIB 200 MG/1
200 CAPSULE ORAL
Refills: 0 | Status: DISCONTINUED | OUTPATIENT
Start: 2019-08-16 | End: 2019-08-16

## 2019-08-16 RX ORDER — ONDANSETRON 8 MG/1
4 TABLET, FILM COATED ORAL EVERY 6 HOURS
Refills: 0 | Status: DISCONTINUED | OUTPATIENT
Start: 2019-08-16 | End: 2019-08-19

## 2019-08-16 RX ORDER — LOSARTAN POTASSIUM 100 MG/1
1 TABLET, FILM COATED ORAL
Qty: 0 | Refills: 0 | DISCHARGE

## 2019-08-16 RX ORDER — SODIUM CHLORIDE 9 MG/ML
500 INJECTION, SOLUTION INTRAVENOUS ONCE
Refills: 0 | Status: COMPLETED | OUTPATIENT
Start: 2019-08-16 | End: 2019-08-16

## 2019-08-16 RX ORDER — ZOLPIDEM TARTRATE 10 MG/1
5 TABLET ORAL AT BEDTIME
Refills: 0 | Status: DISCONTINUED | OUTPATIENT
Start: 2019-08-16 | End: 2019-08-19

## 2019-08-16 RX ORDER — MAGNESIUM HYDROXIDE 400 MG/1
30 TABLET, CHEWABLE ORAL DAILY
Refills: 0 | Status: DISCONTINUED | OUTPATIENT
Start: 2019-08-16 | End: 2019-08-19

## 2019-08-16 RX ORDER — BENZOCAINE AND MENTHOL 5; 1 G/100ML; G/100ML
1 LIQUID ORAL
Refills: 0 | Status: DISCONTINUED | OUTPATIENT
Start: 2019-08-16 | End: 2019-08-19

## 2019-08-16 RX ORDER — LIDOCAINE HCL 20 MG/ML
0.2 VIAL (ML) INJECTION ONCE
Refills: 0 | Status: DISCONTINUED | OUTPATIENT
Start: 2019-08-16 | End: 2019-08-16

## 2019-08-16 RX ORDER — DEXAMETHASONE 0.5 MG/5ML
8 ELIXIR ORAL ONCE
Refills: 0 | Status: COMPLETED | OUTPATIENT
Start: 2019-08-17 | End: 2019-08-17

## 2019-08-16 RX ORDER — FAMOTIDINE 10 MG/ML
20 INJECTION INTRAVENOUS ONCE
Refills: 0 | Status: COMPLETED | OUTPATIENT
Start: 2019-08-16 | End: 2019-08-16

## 2019-08-16 RX ORDER — CHLORHEXIDINE GLUCONATE 213 G/1000ML
1 SOLUTION TOPICAL ONCE
Refills: 0 | Status: DISCONTINUED | OUTPATIENT
Start: 2019-08-16 | End: 2019-08-16

## 2019-08-16 RX ORDER — PSYLLIUM SEED (WITH DEXTROSE)
0 POWDER (GRAM) ORAL
Qty: 0 | Refills: 0 | DISCHARGE

## 2019-08-16 RX ORDER — ACETAMINOPHEN 500 MG
975 TABLET ORAL EVERY 8 HOURS
Refills: 0 | Status: DISCONTINUED | OUTPATIENT
Start: 2019-08-17 | End: 2019-08-19

## 2019-08-16 RX ORDER — CLONAZEPAM 1 MG
0.5 TABLET ORAL
Refills: 0 | Status: DISCONTINUED | OUTPATIENT
Start: 2019-08-16 | End: 2019-08-16

## 2019-08-16 RX ORDER — CELECOXIB 200 MG/1
200 CAPSULE ORAL EVERY 12 HOURS
Refills: 0 | Status: DISCONTINUED | OUTPATIENT
Start: 2019-08-17 | End: 2019-08-17

## 2019-08-16 RX ORDER — LOSARTAN POTASSIUM 100 MG/1
50 TABLET, FILM COATED ORAL
Refills: 0 | Status: DISCONTINUED | OUTPATIENT
Start: 2019-08-16 | End: 2019-08-19

## 2019-08-16 RX ORDER — TRAMADOL HYDROCHLORIDE 50 MG/1
50 TABLET ORAL EVERY 6 HOURS
Refills: 0 | Status: DISCONTINUED | OUTPATIENT
Start: 2019-08-16 | End: 2019-08-16

## 2019-08-16 RX ORDER — TRAMADOL HYDROCHLORIDE 50 MG/1
50 TABLET ORAL ONCE
Refills: 0 | Status: DISCONTINUED | OUTPATIENT
Start: 2019-08-16 | End: 2019-08-16

## 2019-08-16 RX ORDER — RIVAROXABAN 15 MG-20MG
10 KIT ORAL DAILY
Refills: 0 | Status: DISCONTINUED | OUTPATIENT
Start: 2019-08-17 | End: 2019-08-19

## 2019-08-16 RX ORDER — CEFAZOLIN SODIUM 1 G
2000 VIAL (EA) INJECTION EVERY 8 HOURS
Refills: 0 | Status: DISCONTINUED | OUTPATIENT
Start: 2019-08-16 | End: 2019-08-16

## 2019-08-16 RX ORDER — GABAPENTIN 400 MG/1
300 CAPSULE ORAL ONCE
Refills: 0 | Status: COMPLETED | OUTPATIENT
Start: 2019-08-16 | End: 2019-08-16

## 2019-08-16 RX ORDER — ONDANSETRON 8 MG/1
4 TABLET, FILM COATED ORAL ONCE
Refills: 0 | Status: COMPLETED | OUTPATIENT
Start: 2019-08-16 | End: 2019-08-16

## 2019-08-16 RX ORDER — TRAMADOL HYDROCHLORIDE 50 MG/1
50 TABLET ORAL EVERY 6 HOURS
Refills: 0 | Status: DISCONTINUED | OUTPATIENT
Start: 2019-08-16 | End: 2019-08-19

## 2019-08-16 RX ORDER — ACETAMINOPHEN 500 MG
975 TABLET ORAL ONCE
Refills: 0 | Status: COMPLETED | OUTPATIENT
Start: 2019-08-16 | End: 2019-08-16

## 2019-08-16 RX ORDER — ACETAMINOPHEN 500 MG
1000 TABLET ORAL ONCE
Refills: 0 | Status: DISCONTINUED | OUTPATIENT
Start: 2019-08-16 | End: 2019-08-16

## 2019-08-16 RX ORDER — FINASTERIDE 5 MG/1
1 TABLET, FILM COATED ORAL
Qty: 0 | Refills: 0 | DISCHARGE

## 2019-08-16 RX ORDER — CLONAZEPAM 1 MG
1.75 TABLET ORAL DAILY
Refills: 0 | Status: DISCONTINUED | OUTPATIENT
Start: 2019-08-16 | End: 2019-08-16

## 2019-08-16 RX ORDER — CEFAZOLIN SODIUM 1 G
3000 VIAL (EA) INJECTION EVERY 8 HOURS
Refills: 0 | Status: COMPLETED | OUTPATIENT
Start: 2019-08-16 | End: 2019-08-16

## 2019-08-16 RX ORDER — TRAMADOL HYDROCHLORIDE 50 MG/1
25 TABLET ORAL EVERY 6 HOURS
Refills: 0 | Status: DISCONTINUED | OUTPATIENT
Start: 2019-08-16 | End: 2019-08-19

## 2019-08-16 RX ORDER — SODIUM CHLORIDE 9 MG/ML
3 INJECTION INTRAMUSCULAR; INTRAVENOUS; SUBCUTANEOUS EVERY 8 HOURS
Refills: 0 | Status: DISCONTINUED | OUTPATIENT
Start: 2019-08-16 | End: 2019-08-16

## 2019-08-16 RX ORDER — CEFAZOLIN SODIUM 1 G
3000 VIAL (EA) INJECTION ONCE
Refills: 0 | Status: COMPLETED | OUTPATIENT
Start: 2019-08-16 | End: 2019-08-16

## 2019-08-16 RX ORDER — SODIUM CHLORIDE 9 MG/ML
1000 INJECTION, SOLUTION INTRAVENOUS
Refills: 0 | Status: DISCONTINUED | OUTPATIENT
Start: 2019-08-16 | End: 2019-08-19

## 2019-08-16 RX ORDER — HYDROMORPHONE HYDROCHLORIDE 2 MG/ML
2 INJECTION INTRAMUSCULAR; INTRAVENOUS; SUBCUTANEOUS EVERY 4 HOURS
Refills: 0 | Status: DISCONTINUED | OUTPATIENT
Start: 2019-08-16 | End: 2019-08-19

## 2019-08-16 RX ORDER — KETOROLAC TROMETHAMINE 30 MG/ML
30 SYRINGE (ML) INJECTION EVERY 8 HOURS
Refills: 0 | Status: DISCONTINUED | OUTPATIENT
Start: 2019-08-16 | End: 2019-08-18

## 2019-08-16 RX ORDER — PANTOPRAZOLE SODIUM 20 MG/1
40 TABLET, DELAYED RELEASE ORAL
Refills: 0 | Status: DISCONTINUED | OUTPATIENT
Start: 2019-08-16 | End: 2019-08-19

## 2019-08-16 RX ORDER — ZOLPIDEM TARTRATE 10 MG/1
1 TABLET ORAL
Qty: 0 | Refills: 0 | DISCHARGE

## 2019-08-16 RX ORDER — SENNA PLUS 8.6 MG/1
2 TABLET ORAL AT BEDTIME
Refills: 0 | Status: DISCONTINUED | OUTPATIENT
Start: 2019-08-16 | End: 2019-08-19

## 2019-08-16 RX ORDER — ACETAMINOPHEN 500 MG
1000 TABLET ORAL ONCE
Refills: 0 | Status: COMPLETED | OUTPATIENT
Start: 2019-08-16 | End: 2019-08-16

## 2019-08-16 RX ORDER — CLONAZEPAM 1 MG
0.75 TABLET ORAL
Refills: 0 | Status: DISCONTINUED | OUTPATIENT
Start: 2019-08-16 | End: 2019-08-19

## 2019-08-16 RX ORDER — POLYETHYLENE GLYCOL 3350 17 G/17G
17 POWDER, FOR SOLUTION ORAL DAILY
Refills: 0 | Status: DISCONTINUED | OUTPATIENT
Start: 2019-08-16 | End: 2019-08-19

## 2019-08-16 RX ORDER — DIPHENHYDRAMINE HCL 50 MG
12.5 CAPSULE ORAL ONCE
Refills: 0 | Status: COMPLETED | OUTPATIENT
Start: 2019-08-16 | End: 2019-08-16

## 2019-08-16 RX ORDER — FINASTERIDE 5 MG/1
5 TABLET, FILM COATED ORAL DAILY
Refills: 0 | Status: DISCONTINUED | OUTPATIENT
Start: 2019-08-16 | End: 2019-08-19

## 2019-08-16 RX ORDER — DOCUSATE SODIUM 100 MG
100 CAPSULE ORAL THREE TIMES A DAY
Refills: 0 | Status: DISCONTINUED | OUTPATIENT
Start: 2019-08-16 | End: 2019-08-19

## 2019-08-16 RX ORDER — PSYLLIUM SEED (WITH DEXTROSE)
1 POWDER (GRAM) ORAL DAILY
Refills: 0 | Status: DISCONTINUED | OUTPATIENT
Start: 2019-08-16 | End: 2019-08-19

## 2019-08-16 RX ORDER — CLONAZEPAM 1 MG
0 TABLET ORAL
Qty: 0 | Refills: 0 | DISCHARGE

## 2019-08-16 RX ORDER — DIPHENHYDRAMINE HCL 50 MG
25 CAPSULE ORAL EVERY 4 HOURS
Refills: 0 | Status: DISCONTINUED | OUTPATIENT
Start: 2019-08-16 | End: 2019-08-19

## 2019-08-16 RX ORDER — HYDROMORPHONE HYDROCHLORIDE 2 MG/ML
0.5 INJECTION INTRAMUSCULAR; INTRAVENOUS; SUBCUTANEOUS
Refills: 0 | Status: DISCONTINUED | OUTPATIENT
Start: 2019-08-16 | End: 2019-08-16

## 2019-08-16 RX ADMIN — ONDANSETRON 4 MILLIGRAM(S): 8 TABLET, FILM COATED ORAL at 12:15

## 2019-08-16 RX ADMIN — Medication 100 MILLIGRAM(S): at 14:25

## 2019-08-16 RX ADMIN — FAMOTIDINE 20 MILLIGRAM(S): 10 INJECTION INTRAVENOUS at 19:27

## 2019-08-16 RX ADMIN — SODIUM CHLORIDE 500 MILLILITER(S): 9 INJECTION, SOLUTION INTRAVENOUS at 11:00

## 2019-08-16 RX ADMIN — HYDROMORPHONE HYDROCHLORIDE 0.5 MILLIGRAM(S): 2 INJECTION INTRAMUSCULAR; INTRAVENOUS; SUBCUTANEOUS at 12:00

## 2019-08-16 RX ADMIN — Medication 12.5 MILLIGRAM(S): at 19:27

## 2019-08-16 RX ADMIN — HYDROMORPHONE HYDROCHLORIDE 0.5 MILLIGRAM(S): 2 INJECTION INTRAMUSCULAR; INTRAVENOUS; SUBCUTANEOUS at 11:30

## 2019-08-16 RX ADMIN — Medication 30 MILLIGRAM(S): at 11:45

## 2019-08-16 RX ADMIN — Medication 30 MILLIGRAM(S): at 21:30

## 2019-08-16 RX ADMIN — Medication 0.75 MILLIGRAM(S): at 20:08

## 2019-08-16 RX ADMIN — GABAPENTIN 300 MILLIGRAM(S): 400 CAPSULE ORAL at 06:33

## 2019-08-16 RX ADMIN — Medication 100 MILLIGRAM(S): at 22:23

## 2019-08-16 RX ADMIN — TRAMADOL HYDROCHLORIDE 50 MILLIGRAM(S): 50 TABLET ORAL at 07:06

## 2019-08-16 RX ADMIN — ONDANSETRON 4 MILLIGRAM(S): 8 TABLET, FILM COATED ORAL at 16:12

## 2019-08-16 RX ADMIN — Medication 500 MILLIGRAM(S): at 20:02

## 2019-08-16 RX ADMIN — Medication 100 MILLIGRAM(S): at 22:24

## 2019-08-16 RX ADMIN — FAMOTIDINE 20 MILLIGRAM(S): 10 INJECTION INTRAVENOUS at 06:33

## 2019-08-16 RX ADMIN — SODIUM CHLORIDE 500 MILLILITER(S): 9 INJECTION, SOLUTION INTRAVENOUS at 17:33

## 2019-08-16 RX ADMIN — SODIUM CHLORIDE 75 MILLILITER(S): 9 INJECTION, SOLUTION INTRAVENOUS at 13:00

## 2019-08-16 RX ADMIN — Medication 400 MILLIGRAM(S): at 14:46

## 2019-08-16 RX ADMIN — Medication 975 MILLIGRAM(S): at 06:32

## 2019-08-16 RX ADMIN — HYDROMORPHONE HYDROCHLORIDE 0.5 MILLIGRAM(S): 2 INJECTION INTRAMUSCULAR; INTRAVENOUS; SUBCUTANEOUS at 11:45

## 2019-08-16 RX ADMIN — LOSARTAN POTASSIUM 50 MILLIGRAM(S): 100 TABLET, FILM COATED ORAL at 20:07

## 2019-08-16 RX ADMIN — HYDROMORPHONE HYDROCHLORIDE 0.5 MILLIGRAM(S): 2 INJECTION INTRAMUSCULAR; INTRAVENOUS; SUBCUTANEOUS at 11:10

## 2019-08-16 RX ADMIN — Medication 30 MILLIGRAM(S): at 20:02

## 2019-08-16 RX ADMIN — HYDROMORPHONE HYDROCHLORIDE 0.5 MILLIGRAM(S): 2 INJECTION INTRAMUSCULAR; INTRAVENOUS; SUBCUTANEOUS at 11:09

## 2019-08-16 RX ADMIN — HYDROMORPHONE HYDROCHLORIDE 0.5 MILLIGRAM(S): 2 INJECTION INTRAMUSCULAR; INTRAVENOUS; SUBCUTANEOUS at 11:00

## 2019-08-16 NOTE — PROGRESS NOTE ADULT - ASSESSMENT
ASSESSMENT:  57 Y/O  man with PMH HTN, BRITTNY uses CPAP, BPH, Anxiety, Insomnia and OA s/p right THR admitted for Left Total Hip Arthoplasty without complication and in no acute distress.  Elevated Blood Pressure.    PLAN:  Elevated BP: PACU will administer HTN meds and pain control.  Will continue to monitor.  WBAT LLE  DVT Prophylaxis with 10 mg Rivaroxaban daily  Continue pain management with Acetaminophen 975 mg q8hr, Ketorolac 30mg IV Q6h for 24 hours (4 doses total), Celecoxib 200 mg q12hr POD#1 after Ketorolac doses are complete, Hydromorphone 2 mg 4hr PRN for severe pain, Tramadol 50 mg q6hr for moderate pain. Tramadol 25 mg q6hr PRN for mild pain.   Physical Therapy & Occupational Therapy to Evaluate  Regular Diet  F/U CBC and BMP  Disposition: PACU to Floor    MICHI BorreroS  Team [facility] [id number]    Reviewed & Signed  Rolo Fay PA-C  Orthopedic Surgery  Team [id number] ASSESSMENT:  57 Y/O  man with PMH HTN, BRITTNY uses CPAP, BPH, Anxiety, Insomnia and OA s/p right THR admitted for Left Total Hip Arthoplasty without complication and in no acute distress.  Elevated Blood Pressure.    PLAN:  Elevated BP: PACU will administer HTN meds and pain control.  Will continue to monitor.  WBAT LLE  DVT Prophylaxis with 10 mg Rivaroxaban daily  Continue pain management with Ofirmev 1g IV Q8h 3 doses, Acetaminophen 975 mg PO q8hr after IV Tylenol regimen complete, Ketorolac 30mg IV Q6h for 24 hours (4 doses total), Celecoxib 200 mg PO q12hr POD#1 after Ketorolac doses are complete, Hydromorphone 2 mg PO 4hr PRN for severe pain, Tramadol 50 mg PO q6hr for moderate pain. Tramadol 25 mg PO q6hr PRN for mild pain.   Physical Therapy & Occupational Therapy to Evaluate  Regular Diet  F/U CBC and BMP  Disposition: PACU to Floor    ANGELI Borrero  Team [facility] [id number]    Reviewed & Signed  Rolo Fay PA-C  Orthopedic Surgery  Team [id number] ASSESSMENT:  57 Y/O  man with PMH HTN, BRITTNY uses CPAP, BPH, Anxiety, Insomnia and OA s/p right THR admitted for Left Total Hip Arthoplasty without complication and in no acute distress.  Elevated Blood Pressure.    PLAN:  Elevated BP: PACU will administer HTN meds and pain control.  Will continue to monitor.  WBAT LLE  DVT Prophylaxis with 10 mg Rivaroxaban daily  Continue pain management with Ofirmev 1g IV Q8h 3 doses, Acetaminophen 975 mg PO q8hr after IV Tylenol regimen complete, Ketorolac 30mg IV Q6h for 24 hours (4 doses total), Celecoxib 200 mg PO q12hr POD#1 after Ketorolac doses are complete, Hydromorphone 2 mg PO 4hr PRN for severe pain, Tramadol 50 mg PO q6hr for moderate pain. Tramadol 25 mg PO q6hr PRN for mild pain.   Physical Therapy & Occupational Therapy to Evaluate: WBAT LLE, Posterior Hip Precautions  Regular Diet  F/U CBC and BMP  Disposition: PACU to Floor    ANGELI Borrero  Team [facility] [id number]    Reviewed & Signed  Rolo Fay PA-C  Orthopedic Surgery  Team [id number]

## 2019-08-16 NOTE — PHYSICAL THERAPY INITIAL EVALUATION ADULT - PERTINENT HX OF CURRENT PROBLEM, REHAB EVAL
57 y/o M, morbidly obese with PMH HTN, BRITTNY uses CPAP, BPH, Anxiety, Insomnia, and OA s/p R THR, now presents s/p L THR.

## 2019-08-16 NOTE — PROGRESS NOTE ADULT - SUBJECTIVE AND OBJECTIVE BOX
Event Note: Post-Op Note    SUBJECTIVE: Patient is resting comfortably in bed. He complains of left hip incisional pain which is "worse than the previous PAYAL he had last year." He says that the pain has improved after receiving two doses of Tramadol in the PACU. Patient is otherwise without new complaints.    OBJECTIVE:    Vital Signs Last 24 Hrs  T(C): 36.5 (16 Aug 2019 10:25), Max: 36.7 (16 Aug 2019 06:35)  T(F): 97.7 (16 Aug 2019 10:25), Max: 98.1 (16 Aug 2019 06:35)  HR: 80 (16 Aug 2019 11:15) (68 - 83)  BP: 179/121 (16 Aug 2019 11:15) (161/82 - 179/121)  BP(mean): 138 (16 Aug 2019 11:15) (138 - 138)  RR: 17 (16 Aug 2019 11:15) (16 - 17)  SpO2: 99% (16 Aug 2019 11:15) (96% - 100%)    POCT  Blood Glucose (08.16.19 @ 06:13)    POCT Blood Glucose.: 89: NotifiedMDClndMtr mg/dL    CBC and BMP - Ordered, results Pending    PHYSICAL EXAM:    GENERALIZED EXAM: Alert And Oriented x 3. No Acute Distress. Speaking in full sentences. Good Hygiene. Obese.  CARDIAC EXAM: RRR. +S1. +S2. No murmurs, rubs, or gallops noted.  PULMONARY EXAM: Anterior Lung Fields are clear to auscultation. No wheezes, crackles, or rales noted.   EXTREMITY EXAM: LLE  Aquacel Dressing is Clean, dry, and intact. No cellulitis or erythema noted.  Abduction Pillow and Venodyne are in place.  Gastrocnemius and Anterior Tibialis Motor 5/5  FHL/EHL Motor 5/5  Sensation to light touch intact.  Capillary Refill < 2 seconds.  Dorsalis Pedis Pulse 2+. Posterior Tibialis Pulse 2+.    ASSESSMENT:  57 Y/O  man with PMH of Diabetes admitted for Left Total Hip Arthoplasty without complication.     PLAN:  WBAT LLE  DVT Prophylaxis with 10 mg Rivaroxaban daily  Continue pain management with Acetaminophen 975 mg q8hr, Celecoxib 200 mg q12hr, Hydromorphone 2 mg 4hr PRN for severe pain, Tramadol 50 mg q6hr for moderate pain. Tramadol 25 mg q6hr PRN for mild pain.   Diabetic Diet  F/U CBC and BMP    Disposition: Anticipate Home, Official disposition to be determined.    MICHI BorreroS  Team [facility] [id number] Event Note: Post-Op Note    SUBJECTIVE: Patient is resting comfortably in bed. He complains of left hip incisional pain which is "worse than the previous PAYAL I had last year." He says that the pain has improved after receiving two doses of Tramadol in the PACU. Patient is otherwise without new complaints.    OBJECTIVE:    Vital Signs Last 24 Hrs  T(C): 36.5 (16 Aug 2019 10:25), Max: 36.7 (16 Aug 2019 06:35)  T(F): 97.7 (16 Aug 2019 10:25), Max: 98.1 (16 Aug 2019 06:35)  HR: 80 (16 Aug 2019 11:15) (68 - 83)  BP: 179/121 (16 Aug 2019 11:15) (161/82 - 179/121)  BP(mean): 138 (16 Aug 2019 11:15) (138 - 138)  RR: 17 (16 Aug 2019 11:15) (16 - 17)  SpO2: 99% (16 Aug 2019 11:15) (96% - 100%)    POCT  Blood Glucose (08.16.19 @ 06:13)    POCT Blood Glucose.: 89: NotifiedMDClndMtr mg/dL    CBC and BMP - Ordered, results Pending    PHYSICAL EXAM:    GENERALIZED EXAM: Alert And Oriented x 3. No Acute Distress. Speaking in full sentences. Good Hygiene. Obese.  CARDIAC EXAM: RRR. +S1. +S2. No murmurs, rubs, or gallops noted.  PULMONARY EXAM: Anterior Lung Fields are clear to auscultation. No wheezes, crackles, or rales noted.   EXTREMITY EXAM: LLE  Aquacel Dressing is Clean, dry, and intact. No cellulitis or erythema noted.  Abduction Pillow and Venodyne are in place.  Gastrocnemius and Anterior Tibialis Motor 5/5  FHL/EHL Motor 5/5  Sensation to light touch intact.  Capillary Refill < 2 seconds.  Dorsalis Pedis Pulse 2+. Posterior Tibialis Pulse 2+.    ASSESSMENT:  59 Y/O  man with PMH of Diabetes admitted for Left Total Hip Arthoplasty without complication.     PLAN:  WBAT LLE  DVT Prophylaxis with 10 mg Rivaroxaban daily  Continue pain management with Acetaminophen 975 mg q8hr, Celecoxib 200 mg q12hr, Hydromorphone 2 mg 4hr PRN for severe pain, Tramadol 50 mg q6hr for moderate pain. Tramadol 25 mg q6hr PRN for mild pain.   Diabetic Diet  F/U CBC and BMP    Disposition: Anticipate Home, Official disposition to be determined.    MICHI BorreroS  Team [facility] [id number] Event Note: Post-Op Note    SUBJECTIVE: Patient is resting comfortably in bed. He complains of left hip incisional pain which is "worse than the previous PAYAL I had last year." He says that the pain has improved after receiving two doses of dilaudid in the PACU. Patient is otherwise without new complaints.  Patient denies any chest pain or shortness of breath.    OBJECTIVE:    Vital Signs Last 24 Hrs  T(C): 36.5 (16 Aug 2019 10:25), Max: 36.7 (16 Aug 2019 06:35)  T(F): 97.7 (16 Aug 2019 10:25), Max: 98.1 (16 Aug 2019 06:35)  HR: 80 (16 Aug 2019 11:15) (68 - 83)  BP: 179/121 (16 Aug 2019 11:15) (161/82 - 179/121)  BP(mean): 138 (16 Aug 2019 11:15) (138 - 138)  RR: 17 (16 Aug 2019 11:15) (16 - 17)  SpO2: 99% (16 Aug 2019 11:15) (96% - 100%)    POCT  Blood Glucose (08.16.19 @ 06:13)  POCT Blood Glucose.: 89: NotifiedMDClndMtr mg/dL    CBC and BMP - Ordered, results Pending    PHYSICAL EXAM:    GENERALIZED EXAM: Alert And Oriented x 3. No Acute Distress. Speaking in full sentences. Good Hygiene. Obese.  CARDIAC EXAM: RRR. +S1. +S2. No murmurs, rubs, or gallops noted.  PULMONARY EXAM: Anterior and Posterior Lung Fields are clear to auscultation. No wheezes, crackles, or rales noted.   EXTREMITY EXAM: E  Aquacel Dressing is Clean, dry, and intact. No signs of cellulitis or erythema noted.  Abduction Pillow and Venodyne are in place.  No calf edema or tenderness to touch  Gastrocnemius and Anterior Tibialis Motor 5/5  Flexor Hallucis Longus/Extensor Hallucis Longus Motor 5/5  Sensation to light touch intact.  Capillary Refill < 2 seconds.  Dorsalis Pedis Pulse 2+. Posterior Tibialis Pulse 2+.    XRay:   FINDINGS:  Patient status post left total hip arthroplasty. Unchanged appearance of   right total hip arthroplasty. No acute displaced fracture. Pubic   symphysis is intact. Small amount of subcutaneous emphysema overlies the   lateral left thigh.    IMPRESSION:  1.  Interval left total hip arthroplasty.  2.  No acute displaced fracture.

## 2019-08-16 NOTE — PATIENT PROFILE ADULT - NSPROPASSIVESMOKEEXPOSURE_GEN_A_NUR
Patient presents with possible yeast infection.  This am had a thick white discharge and some odor.  Patient would like to be checked for possible STD exposure.   No

## 2019-08-16 NOTE — PHYSICAL THERAPY INITIAL EVALUATION ADULT - ADDITIONAL COMMENTS
Pt lives in elevator access apartment with wife, no steps to enter. Previously independent with use of RW/rollator/straight cane.

## 2019-08-17 LAB
ANION GAP SERPL CALC-SCNC: 11 MMOL/L — SIGNIFICANT CHANGE UP (ref 5–17)
BUN SERPL-MCNC: 18 MG/DL — SIGNIFICANT CHANGE UP (ref 7–23)
CALCIUM SERPL-MCNC: 8.8 MG/DL — SIGNIFICANT CHANGE UP (ref 8.4–10.5)
CHLORIDE SERPL-SCNC: 104 MMOL/L — SIGNIFICANT CHANGE UP (ref 96–108)
CO2 SERPL-SCNC: 25 MMOL/L — SIGNIFICANT CHANGE UP (ref 22–31)
CREAT SERPL-MCNC: 1.33 MG/DL — HIGH (ref 0.5–1.3)
GLUCOSE SERPL-MCNC: 104 MG/DL — HIGH (ref 70–99)
HCT VFR BLD CALC: 35.5 % — LOW (ref 39–50)
HGB BLD-MCNC: 11.6 G/DL — LOW (ref 13–17)
MCHC RBC-ENTMCNC: 29.4 PG — SIGNIFICANT CHANGE UP (ref 27–34)
MCHC RBC-ENTMCNC: 32.7 GM/DL — SIGNIFICANT CHANGE UP (ref 32–36)
MCV RBC AUTO: 89.9 FL — SIGNIFICANT CHANGE UP (ref 80–100)
PLATELET # BLD AUTO: 217 K/UL — SIGNIFICANT CHANGE UP (ref 150–400)
POTASSIUM SERPL-MCNC: 4.7 MMOL/L — SIGNIFICANT CHANGE UP (ref 3.5–5.3)
POTASSIUM SERPL-SCNC: 4.7 MMOL/L — SIGNIFICANT CHANGE UP (ref 3.5–5.3)
RBC # BLD: 3.95 M/UL — LOW (ref 4.2–5.8)
RBC # FLD: 13.8 % — SIGNIFICANT CHANGE UP (ref 10.3–14.5)
SODIUM SERPL-SCNC: 140 MMOL/L — SIGNIFICANT CHANGE UP (ref 135–145)
WBC # BLD: 10.42 K/UL — SIGNIFICANT CHANGE UP (ref 3.8–10.5)
WBC # FLD AUTO: 10.42 K/UL — SIGNIFICANT CHANGE UP (ref 3.8–10.5)

## 2019-08-17 RX ADMIN — Medication 1 PACKET(S): at 11:51

## 2019-08-17 RX ADMIN — Medication 30 MILLIGRAM(S): at 07:00

## 2019-08-17 RX ADMIN — Medication 1 TABLET(S): at 06:01

## 2019-08-17 RX ADMIN — FINASTERIDE 5 MILLIGRAM(S): 5 TABLET, FILM COATED ORAL at 06:03

## 2019-08-17 RX ADMIN — Medication 1 TABLET(S): at 14:55

## 2019-08-17 RX ADMIN — Medication 30 MILLIGRAM(S): at 11:51

## 2019-08-17 RX ADMIN — Medication 500 MILLIGRAM(S): at 06:45

## 2019-08-17 RX ADMIN — RIVAROXABAN 10 MILLIGRAM(S): KIT at 11:51

## 2019-08-17 RX ADMIN — Medication 100 MILLIGRAM(S): at 14:55

## 2019-08-17 RX ADMIN — Medication 1 TABLET(S): at 11:53

## 2019-08-17 RX ADMIN — Medication 975 MILLIGRAM(S): at 17:46

## 2019-08-17 RX ADMIN — SODIUM CHLORIDE 500 MILLILITER(S): 9 INJECTION, SOLUTION INTRAVENOUS at 06:10

## 2019-08-17 RX ADMIN — Medication 100 MILLIGRAM(S): at 06:00

## 2019-08-17 RX ADMIN — Medication 30 MILLIGRAM(S): at 06:00

## 2019-08-17 RX ADMIN — PANTOPRAZOLE SODIUM 40 MILLIGRAM(S): 20 TABLET, DELAYED RELEASE ORAL at 06:01

## 2019-08-17 RX ADMIN — Medication 30 MILLIGRAM(S): at 18:58

## 2019-08-17 RX ADMIN — Medication 0.75 MILLIGRAM(S): at 06:45

## 2019-08-17 RX ADMIN — Medication 30 MILLIGRAM(S): at 11:18

## 2019-08-17 RX ADMIN — Medication 100 MILLIGRAM(S): at 21:15

## 2019-08-17 RX ADMIN — Medication 1 TABLET(S): at 21:15

## 2019-08-17 RX ADMIN — Medication 101.6 MILLIGRAM(S): at 06:01

## 2019-08-17 RX ADMIN — LOSARTAN POTASSIUM 50 MILLIGRAM(S): 100 TABLET, FILM COATED ORAL at 05:59

## 2019-08-17 RX ADMIN — Medication 975 MILLIGRAM(S): at 17:57

## 2019-08-17 RX ADMIN — Medication 0.75 MILLIGRAM(S): at 17:46

## 2019-08-17 RX ADMIN — Medication 500 MILLIGRAM(S): at 17:46

## 2019-08-17 RX ADMIN — Medication 30 MILLIGRAM(S): at 18:10

## 2019-08-17 RX ADMIN — LOSARTAN POTASSIUM 50 MILLIGRAM(S): 100 TABLET, FILM COATED ORAL at 17:46

## 2019-08-17 NOTE — PROGRESS NOTE ADULT - ASSESSMENT
S/P L THR      Plan    ck labs  OOB/PT/WBAT  analgesics as needed       Nancy Cadet PA-C   Beeper    [id number]

## 2019-08-17 NOTE — OCCUPATIONAL THERAPY INITIAL EVALUATION ADULT - LIVES WITH, PROFILE
Pt lives alone in an apartment but upon d/c will be staying in his wife's apartment, +elevator access, +shower tub

## 2019-08-17 NOTE — OCCUPATIONAL THERAPY INITIAL EVALUATION ADULT - PERTINENT HX OF CURRENT PROBLEM, REHAB EVAL
Mr. Layton is a 58 year old morbidly obese man with PMH HTN, BRITTNY uses CPAP, BPH, Anxiety, Insomnia and OA s/p right THR. Now s/p L THR 8/16

## 2019-08-17 NOTE — OCCUPATIONAL THERAPY INITIAL EVALUATION ADULT - DIAGNOSIS, OT EVAL
Pt presents with posterior hip precautions, decreased ROM, strength, endurance, and balance all impacting ability to perform ADLs and functional mobility.

## 2019-08-17 NOTE — PROGRESS NOTE ADULT - SUBJECTIVE AND OBJECTIVE BOX
Patient is a 58y old  Male who presents with a chief complaint of Left PAYAL. (16 Aug 2019 11:24)      POST OPERATIVE DAY #: 1  Patient comfortable  No complaints    VS:  T(C): 36.6 (08-17-19 @ 04:44), Max: 36.6 (08-17-19 @ 04:44)  T(F): 97.9 (08-17-19 @ 04:44), Max: 97.9 (08-17-19 @ 04:44)  HR: 65 (08-17-19 @ 06:15) (62 - 96)  BP: 149/71 (08-17-19 @ 04:44) (133/68 - 183/77)  RR: 18 (08-17-19 @ 04:44) (16 - 18)  SpO2: 95% (08-17-19 @ 06:15) (95% - 100%)  Wt(kg): --      PHYSICAL EXAM:  NAD, Alert  EXT:   Lt Hip  Aquacel Dressing C/D/I  No Calf Tenderness  (+) DF/PF; (+) Distal Pulses;  Sensation: No gross deficits noted  Pulses 2+   B/L, PAS     LABS:    08-17    140  |  104  |  18  ----------------------------<  104<H>  4.7   |  25  |  1.33<H>

## 2019-08-17 NOTE — OCCUPATIONAL THERAPY INITIAL EVALUATION ADULT - RANGE OF MOTION EXAMINATION, LOWER EXTREMITY
bilateral LE Active ROM was WFL  (within functional limits)/L LE limited by posterior hip precautions

## 2019-08-18 ENCOUNTER — TRANSCRIPTION ENCOUNTER (OUTPATIENT)
Age: 59
End: 2019-08-18

## 2019-08-18 RX ADMIN — Medication 30 MILLIGRAM(S): at 02:11

## 2019-08-18 RX ADMIN — Medication 100 MILLIGRAM(S): at 22:33

## 2019-08-18 RX ADMIN — Medication 1 TABLET(S): at 13:29

## 2019-08-18 RX ADMIN — LOSARTAN POTASSIUM 50 MILLIGRAM(S): 100 TABLET, FILM COATED ORAL at 17:36

## 2019-08-18 RX ADMIN — ZOLPIDEM TARTRATE 5 MILLIGRAM(S): 10 TABLET ORAL at 22:33

## 2019-08-18 RX ADMIN — Medication 500 MILLIGRAM(S): at 17:36

## 2019-08-18 RX ADMIN — Medication 975 MILLIGRAM(S): at 20:30

## 2019-08-18 RX ADMIN — Medication 500 MILLIGRAM(S): at 05:27

## 2019-08-18 RX ADMIN — LOSARTAN POTASSIUM 50 MILLIGRAM(S): 100 TABLET, FILM COATED ORAL at 05:28

## 2019-08-18 RX ADMIN — Medication 975 MILLIGRAM(S): at 02:12

## 2019-08-18 RX ADMIN — Medication 975 MILLIGRAM(S): at 11:44

## 2019-08-18 RX ADMIN — POLYETHYLENE GLYCOL 3350 17 GRAM(S): 17 POWDER, FOR SOLUTION ORAL at 11:44

## 2019-08-18 RX ADMIN — Medication 30 MILLIGRAM(S): at 02:40

## 2019-08-18 RX ADMIN — FINASTERIDE 5 MILLIGRAM(S): 5 TABLET, FILM COATED ORAL at 05:27

## 2019-08-18 RX ADMIN — Medication 100 MILLIGRAM(S): at 05:27

## 2019-08-18 RX ADMIN — Medication 0.75 MILLIGRAM(S): at 19:54

## 2019-08-18 RX ADMIN — Medication 975 MILLIGRAM(S): at 02:40

## 2019-08-18 RX ADMIN — RIVAROXABAN 10 MILLIGRAM(S): KIT at 11:44

## 2019-08-18 RX ADMIN — Medication 1 TABLET(S): at 22:33

## 2019-08-18 RX ADMIN — Medication 100 MILLIGRAM(S): at 13:29

## 2019-08-18 RX ADMIN — Medication 975 MILLIGRAM(S): at 12:15

## 2019-08-18 RX ADMIN — Medication 0.75 MILLIGRAM(S): at 11:45

## 2019-08-18 RX ADMIN — Medication 1 TABLET(S): at 05:27

## 2019-08-18 RX ADMIN — PANTOPRAZOLE SODIUM 40 MILLIGRAM(S): 20 TABLET, DELAYED RELEASE ORAL at 05:28

## 2019-08-18 RX ADMIN — Medication 1 PACKET(S): at 13:29

## 2019-08-18 RX ADMIN — Medication 975 MILLIGRAM(S): at 19:56

## 2019-08-18 NOTE — DISCHARGE NOTE PROVIDER - HOSPITAL COURSE
Patient is a 59 y/o M with PMHx HTN, BRITTNY(CPAP), BPH, Anx, Gout, Insomnia, Obesity and PSHx R Total Hip Arthroplasty (2018) admitted to [facility] on 8/16/2019 for elective L Total hip arthroplasty with Dr. Denney.  Patient tolerated procedure well with no complications.  Patient was evaluated by PT who cleared patient for home disposition with home physical therapy.  Remainder of hospital stay with no complications.

## 2019-08-18 NOTE — DISCHARGE NOTE PROVIDER - NSDCFUADDINST_GEN_ALL_CORE_FT
Please Keep Incision/Dressing Clean and Dry.  Staples to be removed 2 weeks from surgery date.  Please call Dr. Denney office to schedule appointment for suture/staple removal and incision care.    PT/OT: WBAT LLE    Blood Clot Prevention: Continue with Xarelto 10mg PO for 6 weeks total.    Please call your primary care provider 4 weeks after your discharge from hospital for medication adjustment and continuum of care as needed.

## 2019-08-18 NOTE — DISCHARGE NOTE PROVIDER - CARE PROVIDER_API CALL
Richie Denney)  Orthopaedic Surgery  [address] [facility], [facility] [address]  [place] [address]  Phone: [phone]  Fax: [phone]  Follow Up Time:

## 2019-08-18 NOTE — PROGRESS NOTE ADULT - SUBJECTIVE AND OBJECTIVE BOX
Patient is a 58y old  Male who presents with a chief complaint of Left PAYAL. (16 Aug 2019 11:24)      POST OPERATIVE DAY #:  2  Patient comfortable  No complaints    VS:  T(C): 36.7 (08-18-19 @ 04:33), Max: 37.2 (08-17-19 @ 21:47)  T(F): 98 (08-18-19 @ 04:33), Max: 98.9 (08-17-19 @ 21:47)  HR: 65 (08-18-19 @ 04:33) (65 - 79)  BP: 127/67 (08-18-19 @ 04:33) (127/67 - 173/82)  RR: 18 (08-18-19 @ 04:33) (18 - 18)  SpO2: 96% (08-18-19 @ 04:33) (94% - 98%)  Wt(kg): --      PHYSICAL EXAM:  NAD, Alert  EXT:   Lt Aquacel Dressing C/D/I  No Calf Tenderness  (+) DF/PF; (+) Distal Pulses;  Sensation: No gross deficits noted  Pulses 2+   B/L, PAS     LABS:                        11.6<L>  10.42 )-----------( 217      ( 17 Aug 2019 09:36 )             35.5<L>    08-17    140  |  104  |  18  ----------------------------<  104<H>  4.7   |  25  |  1.33<H>

## 2019-08-18 NOTE — DISCHARGE NOTE PROVIDER - NSDCCPCAREPLAN_GEN_ALL_CORE_FT
PRINCIPAL DISCHARGE DIAGNOSIS  Diagnosis: Unilateral primary osteoarthritis, left hip  Assessment and Plan of Treatment:

## 2019-08-18 NOTE — DISCHARGE NOTE PROVIDER - NSDCACTIVITY_GEN_ALL_CORE
No heavy lifting/straining/Walking - Indoors allowed/Walking - Outdoors allowed/Showering allowed/Do not drive or operate machinery/Stairs allowed/Do not make important decisions

## 2019-08-19 ENCOUNTER — TRANSCRIPTION ENCOUNTER (OUTPATIENT)
Age: 59
End: 2019-08-19

## 2019-08-19 VITALS
HEART RATE: 75 BPM | OXYGEN SATURATION: 95 % | RESPIRATION RATE: 18 BRPM | DIASTOLIC BLOOD PRESSURE: 85 MMHG | SYSTOLIC BLOOD PRESSURE: 145 MMHG | TEMPERATURE: 98 F

## 2019-08-19 PROCEDURE — 73501 X-RAY EXAM HIP UNI 1 VIEW: CPT

## 2019-08-19 PROCEDURE — 97162 PT EVAL MOD COMPLEX 30 MIN: CPT

## 2019-08-19 PROCEDURE — 72170 X-RAY EXAM OF PELVIS: CPT

## 2019-08-19 PROCEDURE — 97535 SELF CARE MNGMENT TRAINING: CPT

## 2019-08-19 PROCEDURE — 88305 TISSUE EXAM BY PATHOLOGIST: CPT

## 2019-08-19 PROCEDURE — 82962 GLUCOSE BLOOD TEST: CPT

## 2019-08-19 PROCEDURE — C1713: CPT

## 2019-08-19 PROCEDURE — 88311 DECALCIFY TISSUE: CPT

## 2019-08-19 PROCEDURE — 97530 THERAPEUTIC ACTIVITIES: CPT

## 2019-08-19 PROCEDURE — 97116 GAIT TRAINING THERAPY: CPT

## 2019-08-19 PROCEDURE — 97165 OT EVAL LOW COMPLEX 30 MIN: CPT

## 2019-08-19 PROCEDURE — C1776: CPT

## 2019-08-19 PROCEDURE — 80048 BASIC METABOLIC PNL TOTAL CA: CPT

## 2019-08-19 PROCEDURE — 85027 COMPLETE CBC AUTOMATED: CPT

## 2019-08-19 RX ORDER — ASPIRIN/CALCIUM CARB/MAGNESIUM 324 MG
1 TABLET ORAL
Qty: 0 | Refills: 0 | DISCHARGE

## 2019-08-19 RX ORDER — PANTOPRAZOLE SODIUM 20 MG/1
1 TABLET, DELAYED RELEASE ORAL
Qty: 30 | Refills: 0
Start: 2019-08-19 | End: 2019-09-17

## 2019-08-19 RX ORDER — TRAMADOL HYDROCHLORIDE 50 MG/1
1 TABLET ORAL
Qty: 28 | Refills: 0
Start: 2019-08-19 | End: 2019-08-25

## 2019-08-19 RX ORDER — POLYETHYLENE GLYCOL 3350 17 G/17G
17 POWDER, FOR SOLUTION ORAL
Qty: 0 | Refills: 0 | DISCHARGE
Start: 2019-08-19

## 2019-08-19 RX ORDER — MELOXICAM 15 MG/1
1 TABLET ORAL
Qty: 0 | Refills: 0 | DISCHARGE

## 2019-08-19 RX ORDER — RIVAROXABAN 15 MG-20MG
1 KIT ORAL
Qty: 35 | Refills: 0
Start: 2019-08-19 | End: 2019-09-22

## 2019-08-19 RX ORDER — DOCUSATE SODIUM 100 MG
1 CAPSULE ORAL
Qty: 0 | Refills: 0 | DISCHARGE
Start: 2019-08-19

## 2019-08-19 RX ORDER — ACETAMINOPHEN 500 MG
3 TABLET ORAL
Qty: 0 | Refills: 0 | DISCHARGE
Start: 2019-08-19

## 2019-08-19 RX ADMIN — PANTOPRAZOLE SODIUM 40 MILLIGRAM(S): 20 TABLET, DELAYED RELEASE ORAL at 06:17

## 2019-08-19 RX ADMIN — MAGNESIUM HYDROXIDE 30 MILLILITER(S): 400 TABLET, CHEWABLE ORAL at 11:01

## 2019-08-19 RX ADMIN — Medication 500 MILLIGRAM(S): at 06:17

## 2019-08-19 RX ADMIN — LOSARTAN POTASSIUM 50 MILLIGRAM(S): 100 TABLET, FILM COATED ORAL at 06:17

## 2019-08-19 RX ADMIN — POLYETHYLENE GLYCOL 3350 17 GRAM(S): 17 POWDER, FOR SOLUTION ORAL at 11:06

## 2019-08-19 RX ADMIN — Medication 1 TABLET(S): at 06:17

## 2019-08-19 RX ADMIN — Medication 100 MILLIGRAM(S): at 06:17

## 2019-08-19 RX ADMIN — RIVAROXABAN 10 MILLIGRAM(S): KIT at 11:06

## 2019-08-19 RX ADMIN — Medication 1 PACKET(S): at 13:06

## 2019-08-19 RX ADMIN — Medication 1 TABLET(S): at 11:06

## 2019-08-19 RX ADMIN — Medication 975 MILLIGRAM(S): at 11:33

## 2019-08-19 RX ADMIN — Medication 0.75 MILLIGRAM(S): at 11:02

## 2019-08-19 RX ADMIN — Medication 1 TABLET(S): at 13:06

## 2019-08-19 RX ADMIN — Medication 100 MILLIGRAM(S): at 13:06

## 2019-08-19 RX ADMIN — Medication 975 MILLIGRAM(S): at 11:03

## 2019-08-19 RX ADMIN — FINASTERIDE 5 MILLIGRAM(S): 5 TABLET, FILM COATED ORAL at 06:17

## 2019-08-19 NOTE — PROGRESS NOTE ADULT - SUBJECTIVE AND OBJECTIVE BOX
No acute events overnight. Pain well controlled with pain medications.    Vital Signs Last 24 Hrs  T(C): 36.3 (19 Aug 2019 04:42), Max: 37.1 (18 Aug 2019 08:42)  T(F): 97.4 (19 Aug 2019 04:42), Max: 98.7 (18 Aug 2019 08:42)  HR: 71 (19 Aug 2019 04:42) (63 - 88)  BP: 152/81 (19 Aug 2019 04:42) (128/73 - 158/85)  BP(mean): --  RR: 18 (19 Aug 2019 04:42) (16 - 18)  SpO2: 95% (19 Aug 2019 04:42) (95% - 99%)    Exam:  Gen: NAD  Motor: 5/5 EHL/FHL/TA/Gastrocnemius  Sensory: SILT DP/SP/S/S/T nerve distributions  Dressing: Clean, Dry, Intact    A/P: 58 year old male s/p L PAYAL  - Pain Control  - Regular Diet  - PT/OT, OOB  - Discharge Planning

## 2019-08-19 NOTE — DISCHARGE NOTE NURSING/CASE MANAGEMENT/SOCIAL WORK - NSDCDPATPORTLINK_GEN_ALL_CORE
You can access the [facility], offered by [facility], by registering with the following website: [email or web address]

## 2019-08-20 LAB — SURGICAL PATHOLOGY STUDY: SIGNIFICANT CHANGE UP

## 2019-08-23 ENCOUNTER — APPOINTMENT (OUTPATIENT)
Dept: ORTHOPEDIC SURGERY | Facility: CLINIC | Age: 59
End: 2019-08-23
Payer: MEDICARE

## 2019-08-23 ENCOUNTER — CHART COPY (OUTPATIENT)
Age: 59
End: 2019-08-23

## 2019-08-23 PROCEDURE — 99024 POSTOP FOLLOW-UP VISIT: CPT

## 2019-08-23 NOTE — HISTORY OF PRESENT ILLNESS
[Chills] : no chills [Constipation] : no constipation [Diarrhea] : no diarrhea [Fever] : no fever [Dysuria] : no dysuria [Vomiting] : no vomiting [Nausea] : no nausea [Doing Well] : is doing well [Excellent Pain Control] : has excellent pain control [de-identified] : Mr. OMAYRA DELONG is a 58 year male who returns to office status post right THR 8/16/19.\par Presents to office after speaking with home care nurse who said patient has clear serous fluid coming from the incision site and needing a new aquacel bandage applied.\par Level of pain on scale of 1-10 is 3 out of 10\par Home care PT going well.\par Taking Tramadol PRN for pain.\par Cannot take Aspirin for DVT prophylaxis.\par Denies fever, chills, nausea, vomiting, constipation, diarrhea, incision site infection/drainage.\par All review of systems not previously stated as positive are negative. [de-identified] : s/p right total hip replacement 8/16/19. [de-identified] : Return visit in 1 week for full removal  [de-identified] : Wound is healing very well however the aqua seal is stained.  Is removed and in general of the wound is very clean.  A new Aquacel dressing is applied.  There is no redness no erythema.

## 2019-08-26 ENCOUNTER — INBOUND DOCUMENT (OUTPATIENT)
Age: 59
End: 2019-08-26

## 2019-08-28 ENCOUNTER — APPOINTMENT (OUTPATIENT)
Dept: ORTHOPEDIC SURGERY | Facility: CLINIC | Age: 59
End: 2019-08-28

## 2019-09-04 ENCOUNTER — APPOINTMENT (OUTPATIENT)
Dept: ORTHOPEDIC SURGERY | Facility: CLINIC | Age: 59
End: 2019-09-04
Payer: MEDICARE

## 2019-09-04 VITALS
SYSTOLIC BLOOD PRESSURE: 151 MMHG | HEART RATE: 86 BPM | HEIGHT: 68 IN | BODY MASS INDEX: 45.92 KG/M2 | WEIGHT: 303 LBS | DIASTOLIC BLOOD PRESSURE: 74 MMHG

## 2019-09-04 DIAGNOSIS — Z96.642 PRESENCE OF LEFT ARTIFICIAL HIP JOINT: ICD-10-CM

## 2019-09-04 PROCEDURE — 73522 X-RAY EXAM HIPS BI 3-4 VIEWS: CPT

## 2019-09-04 PROCEDURE — 99024 POSTOP FOLLOW-UP VISIT: CPT

## 2019-09-04 NOTE — HISTORY OF PRESENT ILLNESS
[Staples Removed] : staples were removed [Chills] : no chills [Constipation] : no constipation [Diarrhea] : no diarrhea [Dysuria] : no dysuria [Fever] : no fever [Vomiting] : no vomiting [Nausea] : no nausea [Excellent Pain Control] : has excellent pain control [Doing Well] : is doing well [de-identified] : s/p left total hip replacement 8/16/19. [de-identified] : Mr. OMAYRA DELONG is a 58 year male who returns to office status post left THR 8/16/19.\par Level of pain on scale of 1-10 is 2 out of 10.\par Home care PT going very well overall. He is ready to start outpatient PT going forward.\par Taking 2 Tylenol and 1 Tramadol at night PRN.\par Xarelto for DVT prophylaxis he is also taking.\par Denies fever, chills, nausea, vomiting, constipation, diarrhea, incision site infection/drainage.\par All review of systems, family history, social history, surgical history, and past medical history not previously stated as positive are negative. [de-identified] : Wound is healing very well.  His staples are removed.  He is walking full weightbearing and ambulating very well.  He still uses a walker for balance.  This is of great aid because of his weight. [de-identified] : AP of the pelvis and lateral of the right and left hip shows bilateral Biomet  all porous bone ingrowth E - Poly THR s   in good position and well fixed. [de-identified] : Return visit in 6 weeks.

## 2019-10-16 ENCOUNTER — APPOINTMENT (OUTPATIENT)
Dept: ORTHOPEDIC SURGERY | Facility: CLINIC | Age: 59
End: 2019-10-16
Payer: MEDICARE

## 2019-10-16 DIAGNOSIS — Z47.1 AFTERCARE FOLLOWING JOINT REPLACEMENT SURGERY: ICD-10-CM

## 2019-10-16 DIAGNOSIS — Z96.642 PRESENCE OF LEFT ARTIFICIAL HIP JOINT: ICD-10-CM

## 2019-10-16 DIAGNOSIS — Z96.641 AFTERCARE FOLLOWING JOINT REPLACEMENT SURGERY: ICD-10-CM

## 2019-10-16 PROCEDURE — 72170 X-RAY EXAM OF PELVIS: CPT

## 2019-10-16 PROCEDURE — 99024 POSTOP FOLLOW-UP VISIT: CPT

## 2019-10-16 NOTE — HISTORY OF PRESENT ILLNESS
[Chills] : no chills [Constipation] : no constipation [Diarrhea] : no diarrhea [Dysuria] : no dysuria [Fever] : no fever [Nausea] : no nausea [Vomiting] : no vomiting [Doing Well] : is doing well [Excellent Pain Control] : has excellent pain control [de-identified] : s/p left total hip replacement 8/16/19.  [de-identified] : Mr. OMAYRA DELONG is a 58 year male who returns to office status post left THR 8/16/19.\par Level of pain on scale of 1-10 is 2 out of 10.\par Outpatient PT 2x/week has gone very well.\par Not taking pain medicine.\par Denies fever, chills, nausea, vomiting, constipation, diarrhea, incision site infection/drainage.\par All review of systems, family history, social history, surgical history, past medical history, medications, and allergies not previously stated as positive are negative. They were reviewed by me today with the patient and documented accordingly. [de-identified] : Wound is well-healed.  He is steadily improving on his motion and ambulation he can flex to 110 degrees with full extension.  He is walking well. [de-identified] : AP of the pelvis and lateral of the left hip show bilateral old porous Biomet  all porous bone ingrowth E - Poly THR in good position and well fixed. [de-identified] : Return visit in 3 months.

## 2019-11-14 ENCOUNTER — FORM ENCOUNTER (OUTPATIENT)
Age: 59
End: 2019-11-14

## 2019-11-18 ENCOUNTER — FORM ENCOUNTER (OUTPATIENT)
Age: 59
End: 2019-11-18

## 2020-01-14 ENCOUNTER — APPOINTMENT (OUTPATIENT)
Dept: ORTHOPEDIC SURGERY | Facility: CLINIC | Age: 60
End: 2020-01-14
Payer: MEDICARE

## 2020-01-14 DIAGNOSIS — M54.5 LOW BACK PAIN: ICD-10-CM

## 2020-01-14 PROCEDURE — 99213 OFFICE O/P EST LOW 20 MIN: CPT

## 2020-01-14 PROCEDURE — 72100 X-RAY EXAM L-S SPINE 2/3 VWS: CPT

## 2020-01-14 PROCEDURE — 72170 X-RAY EXAM OF PELVIS: CPT

## 2020-01-14 RX ORDER — MELOXICAM 15 MG/1
15 TABLET ORAL
Qty: 90 | Refills: 2 | Status: ACTIVE | COMMUNITY
Start: 2020-01-14 | End: 1900-01-01

## 2020-01-14 NOTE — PHYSICAL EXAM
[de-identified] : AP and lateral of the lumbar spine shows anterior osteophytes of the vertebral bodies of L1-L2 L2-L3 L3-L4    a slight spondylolisthesis of L4 anteriorly on L5. He   Does have degenerative arthritis in the facets of the lower lumbar spine.\par \par AP of his pelvis and a lateral of his left hip which is a newer of his hips shows bilateral Biomet porous total hip replacements both in good position and well fixed there is no evidence of any significant calcification over the greater trochanter. [FreeTextEntry2] : Far as his back his deep tendon reflexes motor and sensory are symmetric he does have some discomfort going toward the buttocks in the lower back toward his ileum on right and left and this is probably from his back\par \par As far as his bilateral hip replacements they appear to be doing well he can flex both beyond 115 degrees abduct beyond 60 degrees and adduction duct to 20 degrees with full extension.  At this time it appears as is his hips are doing satisfactory he does have some pain now over his left greater trochanter it is mild it is well localized and consistent with bursitis and possibly mild traumatic bursitis from his fall.  \par \par

## 2020-01-14 NOTE — HISTORY OF PRESENT ILLNESS
[de-identified] : Mr. OMAYRA DELONG is a 59 year male who returns to office status post left THR 8/16/19.\par Patient says he has begun experiencing lateral hip pain over his GT bursa about 2 weeks ago with insidious onset.  He did fall 1 week before when the chair slid out from under him and he landed on his hips and back.  At that time he did not feel any significant difference or marked increase in the pain and did feel that he had a significant injury.\par This pain is more so with lying on the affected hip and with general motion of the hip such as flexion and abduction.\par Patient says her stopped his PT as a result of this.\par Patient has not needed to take any medicine for the pain.\par Denies fever, chills, nausea, vomiting, constipation, diarrhea, incision site infection/drainage.\par All review of systems, family history, social history, surgical history, past medical history, medications, and allergies not previously stated as positive are negative. They were reviewed by me today with the patient and documented accordingly. Current symptoms include no chills, no constipation, no diarrhea, no dysuria, no fever, no nausea and no vomiting.

## 2020-01-14 NOTE — DISCUSSION/SUMMARY
[de-identified] : He has not been taking any anti-inflammatory.  He will again try taking 15 mm of meloxicam a day when necessary and he would like to just stay on conservative measures.  Return visit in 6 months.

## 2020-07-08 ENCOUNTER — APPOINTMENT (OUTPATIENT)
Dept: ORTHOPEDIC SURGERY | Facility: CLINIC | Age: 60
End: 2020-07-08
Payer: MEDICARE

## 2020-07-08 VITALS
DIASTOLIC BLOOD PRESSURE: 80 MMHG | HEIGHT: 68 IN | HEART RATE: 91 BPM | WEIGHT: 315 LBS | BODY MASS INDEX: 47.74 KG/M2 | SYSTOLIC BLOOD PRESSURE: 163 MMHG | TEMPERATURE: 98.7 F

## 2020-07-08 PROCEDURE — 99213 OFFICE O/P EST LOW 20 MIN: CPT

## 2020-07-08 PROCEDURE — 73522 X-RAY EXAM HIPS BI 3-4 VIEWS: CPT

## 2020-07-08 PROCEDURE — 72100 X-RAY EXAM L-S SPINE 2/3 VWS: CPT

## 2020-07-08 NOTE — DISCUSSION/SUMMARY
[de-identified] : He continues to do extremely well in his right and left total hip replacement.  At this time we will try to lose weight.  He will follow conservative spine measures.  Return visit in 1 year or sooner if necessary.

## 2020-07-08 NOTE — PHYSICAL EXAM
[de-identified] : As before the AP and lateral of the lumbar spine shows anterior osteophytes of the vertebral bodies of L1-L2 L2-L3 L3-L4    a slight spondylolisthesis of L4 anteriorly on L5.  There is no real change at this time from his previous x-rays.  He has significant degenerative disc and degenerative arthritis in the lumbar spine. \par \par \par AP of his pelvis and a lateral of his left hip which is a newer of his hips shows bilateral Biomet porous total hip replacements both in good position and well fixed there is no evidence of any significant calcification over the greater trochanter.  There is no evidence of loosening. [FreeTextEntry2] : \par He is doing extremely well with his total hip replacements.  He does have approximately symmetric motion with flexion 115 and he can abduct to 65 degrees.  His abduction goes approximately 20 degrees with external rotation of 65 degrees and internal rotation of 10 degrees.  He has no pain in the groin he has no real pain around his hips.  He does get pain in his lower back and some radicular pain into the buttocks.\par \par His discomfort at this time does come from his back he is managing he easily walks on his toes and his heels sensation is normal deep tendon reflexes are symmetric.  At this time he is following conservative measures.  Because of his weight I strongly encouraged him to try to bring his weight down or even be seen by bariatric surgeons.

## 2020-07-08 NOTE — HISTORY OF PRESENT ILLNESS
[de-identified] : Patient is status post left THR 8/16/19.\par He is doing well overall regarding his left hip.\par He is also status post right THR 7/30/18 doing well.\par However, patient has been complaining of lower back/SI joint pain worse over the last few months.\par For this, he takes Tylenol arthritis. He does not take NSAIDs due to decreases in kidney function tests.\par He denies radiating pain or distal neuropathy.

## 2021-07-13 ENCOUNTER — APPOINTMENT (OUTPATIENT)
Dept: ORTHOPEDIC SURGERY | Facility: CLINIC | Age: 61
End: 2021-07-13
Payer: MEDICARE

## 2021-07-13 DIAGNOSIS — M17.12 UNILATERAL PRIMARY OSTEOARTHRITIS, LEFT KNEE: ICD-10-CM

## 2021-07-13 DIAGNOSIS — M17.11 UNILATERAL PRIMARY OSTEOARTHRITIS, RIGHT KNEE: ICD-10-CM

## 2021-07-13 PROCEDURE — 73522 X-RAY EXAM HIPS BI 3-4 VIEWS: CPT

## 2021-07-13 PROCEDURE — 99213 OFFICE O/P EST LOW 20 MIN: CPT

## 2021-07-13 PROCEDURE — 73564 X-RAY EXAM KNEE 4 OR MORE: CPT | Mod: 50

## 2021-07-13 NOTE — DISCUSSION/SUMMARY
[de-identified] : This patient is doing very well with his bilateral total hip replacements however with his morbid obesity is getting more pain will develop arthritis in his knees he is sore now in the right and left medial compartments.  He does not want local injections and he would like to be follow conservatively he just wanted to see how his knees were and he felt as if he himself had arthritis.  Return visit in 3 months as needed.

## 2021-07-13 NOTE — HISTORY OF PRESENT ILLNESS
[de-identified] : Patient is status post left THR 8/16/19.\par He is doing well overall regarding his left hip.\par He is also status post right THR 7/30/18 doing well.\par He has been dealing with GT bursa pain on and off in each hip recently as well as bilateral knee pain.

## 2021-07-13 NOTE — PHYSICAL EXAM
[de-identified] : An AP of the pelvis and a lateral of his right and left hip shows bilateral all porous total hip replacements both are in excellent position and there is no obvious evidence of loosening or osteolysis.\par \par 4 views were done of both the right and left knees the AP and the Reilly views appear to show narrowing of the medial compartment.  The lateral views show the patella at a proper height and the skyline views are difficult because of the patient's morbid obesity and his body weight and appear to show.  Knee right side the patella tracking satisfactory is difficult to visualize fully the left.  Impression bilateral degenerative medial compartment of his knees which is aggravated by his morbid obesity. [FreeTextEntry2] : \jose antonio Continues to do extremely well with his total hip replacements on today's exam he has  flexion 120 and he can abduct to 75 degrees.  His abduction goes approximately 20 degrees with external rotation of 65 degrees and internal rotation of 10 degrees.  He has no pain in the groin he has no real pain around his hips.  He does get pain in his lower back and some radicular pain into the buttocks.\par \par He has been having a problem with pain in the inner aspect of both knees he comes in for evaluation of his knees his knees knees each go from 0 to 115 degrees he has good medial lateral and anterior posterior stability but he does have pain over the medial joint line bilaterally his patella track satisfactory there is no major effusion at this time but localized pain and a positive medial Steinmann test

## 2022-03-11 NOTE — ASU PREOP CHECKLIST - AS BP NONINV SITE
Body Location Override (Optional): left ventral forearm Detail Level: Detailed Add 00052 Cpt? (Important Note: In 2017 The Use Of 68773 Is Being Tracked By Cms To Determine Future Global Period Reimbursement For Global Periods): yes Wound Diameter In Cm(Optional): 0 Wound Crusting?: clean Sutures?: intact Wound Edema?: mild Wound Color?: pink Patient To Follow-Up With?: our clinic Follow Up Units (Optional): 1 Follow Up Time Frame (Optional): weeks right upper arm

## 2022-04-21 ENCOUNTER — APPOINTMENT (OUTPATIENT)
Dept: PULMONOLOGY | Facility: CLINIC | Age: 62
End: 2022-04-21
Payer: MEDICARE

## 2022-04-21 VITALS
BODY MASS INDEX: 49.44 KG/M2 | HEART RATE: 79 BPM | HEIGHT: 67 IN | SYSTOLIC BLOOD PRESSURE: 142 MMHG | DIASTOLIC BLOOD PRESSURE: 74 MMHG | TEMPERATURE: 98.6 F | OXYGEN SATURATION: 95 % | WEIGHT: 315 LBS

## 2022-04-21 DIAGNOSIS — R93.89 ABNORMAL FINDINGS ON DIAGNOSTIC IMAGING OF OTHER SPECIFIED BODY STRUCTURES: ICD-10-CM

## 2022-04-21 DIAGNOSIS — G47.33 OBSTRUCTIVE SLEEP APNEA (ADULT) (PEDIATRIC): ICD-10-CM

## 2022-04-21 DIAGNOSIS — Z99.89 OBSTRUCTIVE SLEEP APNEA (ADULT) (PEDIATRIC): ICD-10-CM

## 2022-04-21 DIAGNOSIS — R91.1 SOLITARY PULMONARY NODULE: ICD-10-CM

## 2022-04-21 DIAGNOSIS — R05.9 COUGH, UNSPECIFIED: ICD-10-CM

## 2022-04-21 PROCEDURE — 94060 EVALUATION OF WHEEZING: CPT

## 2022-04-21 PROCEDURE — 94727 GAS DIL/WSHOT DETER LNG VOL: CPT

## 2022-04-21 PROCEDURE — 99205 OFFICE O/P NEW HI 60 MIN: CPT | Mod: 25

## 2022-04-21 PROCEDURE — 94729 DIFFUSING CAPACITY: CPT

## 2022-04-21 RX ORDER — SERTRALINE HYDROCHLORIDE 100 MG/1
100 TABLET, FILM COATED ORAL
Refills: 0 | Status: ACTIVE | COMMUNITY

## 2022-04-21 RX ORDER — CLONAZEPAM 0.5 MG/1
0.5 TABLET ORAL
Refills: 0 | Status: ACTIVE | COMMUNITY

## 2022-04-21 NOTE — PHYSICAL EXAM
[No Acute Distress] : no acute distress [No Neck Mass] : no neck mass [Normal S1, S2] : normal s1, s2 [Clear to Auscultation Bilaterally] : clear to auscultation bilaterally [No HSM] : no hsm [No Cyanosis] : no cyanosis [No Edema] : no edema [Normal Turgor] : normal turgor [Oriented x3] : oriented x3 [Normal Affect] : normal affect

## 2022-04-21 NOTE — DISCUSSION/SUMMARY
[FreeTextEntry1] : He is a 61 year old former smoker with a history of hypertension and BRITTNY who was referred for abnormal radiographic findings. \par \par He was seen in urgent care for a cough. A CXR was done and a possible nodule was noted. The CXR report was not available.\par \par The physical examination was unremarkable. PFT was normal. \par \par If CXR report and images cannot be obtained a CXR will be repeated. \par \par Further recommendations to follow the results of the above. \par

## 2022-04-21 NOTE — REVIEW OF SYSTEMS
[Cough] : cough [Fever] : no fever [Sputum] : no sputum [Dyspnea] : no dyspnea [Wheezing] : no wheezing [Chest Discomfort] : no chest discomfort [Nasal Discharge] : no nasal discharge [GERD] : no gerd [Myalgias] : no myalgias [Rash] : no rash [History of Iron Deficiency] : no history of iron deficiency [Headache] : no headache [Diabetes] : no diabetes

## 2022-04-21 NOTE — PROCEDURE
[FreeTextEntry1] : PFT 4/21/22: Spirometry was normal. Lung volumes were normal. Diffusion capacity was normal. No significant improvement after bronchodilator. \par

## 2022-04-21 NOTE — HISTORY OF PRESENT ILLNESS
[Former] : former [Obstructive Sleep Apnea] : obstructive sleep apnea [TextBox_4] : Seen in urgent care for cough and URI. CXR showed "nodule." Report not available. \par \par Cough now better. \par \par NO SOB or wheezing. \par \par H/O BRITTNY on CPAP. \par \par Retired [employer]. \par  [YearQuit] : 2001

## 2022-04-22 PROBLEM — R91.1 PULMONARY NODULE: Status: ACTIVE | Noted: 2022-04-22

## 2022-05-09 ENCOUNTER — NON-APPOINTMENT (OUTPATIENT)
Age: 62
End: 2022-05-09

## 2022-07-12 ENCOUNTER — APPOINTMENT (OUTPATIENT)
Dept: ORTHOPEDIC SURGERY | Facility: CLINIC | Age: 62
End: 2022-07-12

## 2022-07-12 VITALS — BODY MASS INDEX: 47.74 KG/M2 | WEIGHT: 315 LBS | HEIGHT: 68 IN

## 2022-07-12 PROCEDURE — 73522 X-RAY EXAM HIPS BI 3-4 VIEWS: CPT

## 2022-07-12 PROCEDURE — 99213 OFFICE O/P EST LOW 20 MIN: CPT

## 2022-07-12 NOTE — DISCUSSION/SUMMARY
[de-identified] : Is doing very well with his right left total hip replacements he will return in 1 year for follow-up or sooner if necessary.

## 2022-07-12 NOTE — PHYSICAL EXAM
Pt called and stated she had a few blood clots in her stool and she was nauseated and bloated since eating. Informed pt since biopsys were taken the clots were not uncommon and instructed her that nausea should get better. If these symptoms get worse she will need to go to the ER.    [de-identified] : An AP of the pelvis and a lateral of his right and left hip shows bilateral all porous total hip replacements both are in excellent position and there is no  evidence of loosening or osteolysis.  Femoral heads remain in the center of the acetabulum.\par  [FreeTextEntry2] : He is doing very well with both hip replacements.  On today's exam he has symmetric motion with flexion of 130 degrees, abduction 80 degrees, adduction 25 degrees, external rotation 75 degrees and internal rotation 20 degrees.  He is walking very well.  He has no pain in his groin.  He does have mild trochanteric bursitis over the trochanter right left but he does not want another injection.  This possibly is because because of his weight.\par \par Knee exam is not changed.\par \par

## 2022-07-12 NOTE — HISTORY OF PRESENT ILLNESS
[de-identified] : Patient is status post left THR 8/16/19.\par He is doing well overall regarding his left hip.\par He is also status post right THR 7/30/18 doing well.

## 2022-09-13 ENCOUNTER — APPOINTMENT (OUTPATIENT)
Dept: ORTHOPEDIC SURGERY | Facility: CLINIC | Age: 62
End: 2022-09-13

## 2022-09-13 VITALS — HEIGHT: 68 IN | WEIGHT: 315 LBS | BODY MASS INDEX: 47.74 KG/M2

## 2022-09-13 DIAGNOSIS — M43.16 SPONDYLOLISTHESIS, LUMBAR REGION: ICD-10-CM

## 2022-09-13 PROCEDURE — 99214 OFFICE O/P EST MOD 30 MIN: CPT

## 2022-09-13 PROCEDURE — 99204 OFFICE O/P NEW MOD 45 MIN: CPT

## 2022-09-13 PROCEDURE — 72100 X-RAY EXAM L-S SPINE 2/3 VWS: CPT

## 2022-09-13 RX ORDER — GABAPENTIN 100 MG/1
100 CAPSULE ORAL
Qty: 90 | Refills: 1 | Status: ACTIVE | COMMUNITY
Start: 2022-09-13 | End: 1900-01-01

## 2022-09-13 RX ORDER — DICLOFENAC SODIUM 16.05 MG/ML
1.5 SOLUTION TOPICAL
Qty: 1 | Refills: 1 | Status: ACTIVE | COMMUNITY
Start: 2022-09-13 | End: 1900-01-01

## 2022-09-20 NOTE — H&P PST ADULT - NEUROLOGICAL SYMPTOMS
Physical Therapy  Patient not seen in therapy.     Unavailable due to medical tests/procedures.      Pt in Ct scan for  Guided biopsy per RN.         OBJECTIVE                     Therapy procedure time and total treatment time can be found documented on the Time Entry flowsheet   paresthesias/numbness in hands

## 2022-10-13 ENCOUNTER — NON-APPOINTMENT (OUTPATIENT)
Age: 62
End: 2022-10-13

## 2022-10-14 ENCOUNTER — APPOINTMENT (OUTPATIENT)
Dept: ORTHOPEDIC SURGERY | Facility: CLINIC | Age: 62
End: 2022-10-14
Payer: MEDICARE

## 2022-10-14 PROCEDURE — 99442: CPT | Mod: 95

## 2022-11-02 ENCOUNTER — APPOINTMENT (OUTPATIENT)
Dept: PLASTIC SURGERY | Facility: CLINIC | Age: 62
End: 2022-11-02

## 2022-11-02 VITALS
HEART RATE: 70 BPM | TEMPERATURE: 97.7 F | HEIGHT: 68 IN | DIASTOLIC BLOOD PRESSURE: 80 MMHG | SYSTOLIC BLOOD PRESSURE: 163 MMHG | WEIGHT: 315 LBS | BODY MASS INDEX: 47.74 KG/M2

## 2022-11-02 DIAGNOSIS — L98.9 DISORDER OF THE SKIN AND SUBCUTANEOUS TISSUE, UNSPECIFIED: ICD-10-CM

## 2022-11-02 PROCEDURE — 99202 OFFICE O/P NEW SF 15 MIN: CPT

## 2022-11-03 ENCOUNTER — APPOINTMENT (OUTPATIENT)
Dept: ORTHOPEDIC SURGERY | Facility: CLINIC | Age: 62
End: 2022-11-03

## 2022-11-03 PROBLEM — L98.9 SKIN LESION: Status: ACTIVE | Noted: 2022-11-03

## 2022-11-03 NOTE — PHYSICAL EXAM
[de-identified] : Right lower abdomen with an approximately 6 cm x 2 cm raised, mildly erythematous area without significant fluctuance.  No expressible drainage.  No significant tenderness.  There is no surrounding induration.

## 2022-11-03 NOTE — ASSESSMENT
[FreeTextEntry1] : Right lower abdominal skin lesion, possibly representative of hidradenitis, without abscess, cellulitis, or clinical evidence of active infection.  No indication for surgical intervention.  Recommend primary care and/or dermatology follow-up.  Recommend return for excision/drainage if collection continues to recur or if there active infection with a drainable collection.

## 2022-11-03 NOTE — HISTORY OF PRESENT ILLNESS
[FreeTextEntry1] : The patient returns with a 5-day history of an area of fullness of the right groin which he states drained spontaneously.  He denies significant discomfort in the area now.  He denies any further drainage.  This is the first time a lesion has appeared in this area.  He states the area on the left side is well-healed.  He has not been to see his primary care doctor about this issue.

## 2023-07-12 ENCOUNTER — APPOINTMENT (OUTPATIENT)
Dept: ORTHOPEDIC SURGERY | Facility: CLINIC | Age: 63
End: 2023-07-12
Payer: MEDICARE

## 2023-07-12 VITALS
OXYGEN SATURATION: 98 % | HEART RATE: 67 BPM | HEIGHT: 68 IN | SYSTOLIC BLOOD PRESSURE: 164 MMHG | WEIGHT: 315 LBS | DIASTOLIC BLOOD PRESSURE: 80 MMHG | BODY MASS INDEX: 47.74 KG/M2

## 2023-07-12 DIAGNOSIS — Z96.642 PRESENCE OF LEFT ARTIFICIAL HIP JOINT: ICD-10-CM

## 2023-07-12 DIAGNOSIS — Z96.641 PRESENCE OF RIGHT ARTIFICIAL HIP JOINT: ICD-10-CM

## 2023-07-12 DIAGNOSIS — M51.37 OTHER INTERVERTEBRAL DISC DEGENERATION, LUMBOSACRAL REGION: ICD-10-CM

## 2023-07-12 PROCEDURE — 73522 X-RAY EXAM HIPS BI 3-4 VIEWS: CPT

## 2023-07-12 PROCEDURE — 99213 OFFICE O/P EST LOW 20 MIN: CPT

## 2023-07-12 NOTE — DISCUSSION/SUMMARY
[de-identified] : Is doing very well with his right left total hip replacements he will return in 1 year for follow-up or sooner if necessary.

## 2023-07-12 NOTE — PHYSICAL EXAM
[de-identified] : He is doing very well with both hip replacements.  On today's exam he has symmetric motion with flexion of 130 degrees, abduction 80 degrees, adduction 25 degrees, external rotation 75 degrees and internal rotation 20 degrees.  He is walking very well.  He does occasionally get some burning pain down the side of his leg but this appears to be much more due to his back.  He does have significant degenerative changes in his lumbar spine.  He has no pain in his groin.  Previous trochanteric bursitis is greatly improved.  His hips are doing very well.\par  [de-identified] : \par Knee exam is not changed.An AP of the pelvis and a lateral of his right and left hip shows bilateral all porous total hip replacements both are in excellent position and there is no  evidence of loosening or osteolysis.  Femoral heads remain in the center of the acetabulum.\par  [FreeTextEntry2] : \par

## 2023-07-12 NOTE — HISTORY OF PRESENT ILLNESS
[de-identified] : Patient is status post right THR 7/30/18 and s/p left THR 8/16/19.\par He is doing well overall regarding his right and left hips.\par He has no issue with his ADLs.

## 2024-06-27 NOTE — PATIENT PROFILE ADULT. - ALCOHOL USE HISTORY SINGLE SELECT
Your neck was fully packed.  I did give you a discolored liquid to make sure there was no fistula.  No discolored fluid from the wound.  Follow-up on Saturday to see Dr. Covarrubias on Monday to see me for repacking.  Continue Bactrim.  Call with any additional questions or problems.    
yes...

## 2024-07-09 ENCOUNTER — APPOINTMENT (OUTPATIENT)
Dept: ORTHOPEDIC SURGERY | Facility: CLINIC | Age: 64
End: 2024-07-09
Payer: MEDICARE

## 2024-07-09 VITALS — BODY MASS INDEX: 47.74 KG/M2 | WEIGHT: 315 LBS | HEIGHT: 68 IN

## 2024-07-09 DIAGNOSIS — M25.561 PAIN IN RIGHT KNEE: ICD-10-CM

## 2024-07-09 DIAGNOSIS — Z96.642 PRESENCE OF LEFT ARTIFICIAL HIP JOINT: ICD-10-CM

## 2024-07-09 DIAGNOSIS — Z96.641 PRESENCE OF RIGHT ARTIFICIAL HIP JOINT: ICD-10-CM

## 2024-07-09 DIAGNOSIS — M17.11 UNILATERAL PRIMARY OSTEOARTHRITIS, RIGHT KNEE: ICD-10-CM

## 2024-07-09 PROCEDURE — 73564 X-RAY EXAM KNEE 4 OR MORE: CPT | Mod: RT

## 2024-07-09 PROCEDURE — 73522 X-RAY EXAM HIPS BI 3-4 VIEWS: CPT

## 2024-07-09 PROCEDURE — 73560 X-RAY EXAM OF KNEE 1 OR 2: CPT | Mod: LT

## 2024-07-09 PROCEDURE — 99213 OFFICE O/P EST LOW 20 MIN: CPT

## 2025-03-17 ENCOUNTER — APPOINTMENT (OUTPATIENT)
Age: 65
End: 2025-03-17
Payer: MEDICARE

## 2025-03-17 ENCOUNTER — NON-APPOINTMENT (OUTPATIENT)
Age: 65
End: 2025-03-17

## 2025-03-17 PROCEDURE — 92133 CPTRZD OPH DX IMG PST SGM ON: CPT

## 2025-03-17 PROCEDURE — 92012 INTRM OPH EXAM EST PATIENT: CPT

## 2025-03-17 PROCEDURE — 92083 EXTENDED VISUAL FIELD XM: CPT

## 2025-06-10 ENCOUNTER — NON-APPOINTMENT (OUTPATIENT)
Age: 65
End: 2025-06-10

## 2025-06-10 ENCOUNTER — APPOINTMENT (OUTPATIENT)
Dept: ORTHOPEDIC SURGERY | Facility: CLINIC | Age: 65
End: 2025-06-10
Payer: MEDICARE

## 2025-06-10 VITALS — HEIGHT: 68 IN | BODY MASS INDEX: 46.98 KG/M2 | WEIGHT: 310 LBS

## 2025-06-10 PROBLEM — M51.26 DISPLACEMENT OF LUMBAR INTERVERTEBRAL DISC: Status: ACTIVE | Noted: 2025-06-10

## 2025-06-10 PROBLEM — M23.92 INTERNAL DERANGEMENT OF KNEE, LEFT: Status: ACTIVE | Noted: 2025-06-10

## 2025-06-10 PROCEDURE — 72100 X-RAY EXAM L-S SPINE 2/3 VWS: CPT

## 2025-06-10 PROCEDURE — 99214 OFFICE O/P EST MOD 30 MIN: CPT | Mod: 25

## 2025-06-10 PROCEDURE — 73522 X-RAY EXAM HIPS BI 3-4 VIEWS: CPT

## 2025-06-10 PROCEDURE — 20610 DRAIN/INJ JOINT/BURSA W/O US: CPT | Mod: LT

## 2025-06-10 PROCEDURE — 73560 X-RAY EXAM OF KNEE 1 OR 2: CPT | Mod: RT

## 2025-06-10 PROCEDURE — 73564 X-RAY EXAM KNEE 4 OR MORE: CPT | Mod: LT

## 2025-06-10 RX ORDER — MELOXICAM 7.5 MG/1
7.5 TABLET ORAL DAILY
Qty: 180 | Refills: 2 | Status: ACTIVE | COMMUNITY
Start: 2025-06-10 | End: 1900-01-01